# Patient Record
Sex: MALE | Race: WHITE | NOT HISPANIC OR LATINO
[De-identification: names, ages, dates, MRNs, and addresses within clinical notes are randomized per-mention and may not be internally consistent; named-entity substitution may affect disease eponyms.]

---

## 2018-05-13 ENCOUNTER — TRANSCRIPTION ENCOUNTER (OUTPATIENT)
Age: 58
End: 2018-05-13

## 2019-09-08 ENCOUNTER — EMERGENCY (EMERGENCY)
Facility: HOSPITAL | Age: 59
LOS: 0 days | Discharge: HOME | End: 2019-09-08
Attending: EMERGENCY MEDICINE | Admitting: EMERGENCY MEDICINE
Payer: COMMERCIAL

## 2019-09-08 VITALS
HEIGHT: 68 IN | TEMPERATURE: 96 F | SYSTOLIC BLOOD PRESSURE: 150 MMHG | RESPIRATION RATE: 18 BRPM | HEART RATE: 84 BPM | DIASTOLIC BLOOD PRESSURE: 114 MMHG | WEIGHT: 179.9 LBS | OXYGEN SATURATION: 98 %

## 2019-09-08 VITALS
RESPIRATION RATE: 18 BRPM | HEART RATE: 83 BPM | SYSTOLIC BLOOD PRESSURE: 154 MMHG | OXYGEN SATURATION: 97 % | TEMPERATURE: 97 F | DIASTOLIC BLOOD PRESSURE: 93 MMHG

## 2019-09-08 DIAGNOSIS — N39.43 POST-VOID DRIBBLING: ICD-10-CM

## 2019-09-08 LAB
APPEARANCE UR: CLEAR — SIGNIFICANT CHANGE UP
BILIRUB UR-MCNC: NEGATIVE — SIGNIFICANT CHANGE UP
COLOR SPEC: YELLOW — SIGNIFICANT CHANGE UP
DIFF PNL FLD: NEGATIVE — SIGNIFICANT CHANGE UP
GLUCOSE UR QL: NEGATIVE MG/DL — SIGNIFICANT CHANGE UP
KETONES UR-MCNC: NEGATIVE — SIGNIFICANT CHANGE UP
LEUKOCYTE ESTERASE UR-ACNC: NEGATIVE — SIGNIFICANT CHANGE UP
NITRITE UR-MCNC: NEGATIVE — SIGNIFICANT CHANGE UP
PH UR: 8.5 — SIGNIFICANT CHANGE UP (ref 5–8)
PROT UR-MCNC: NEGATIVE MG/DL — SIGNIFICANT CHANGE UP
SP GR SPEC: 1.02 — SIGNIFICANT CHANGE UP (ref 1.01–1.03)
UROBILINOGEN FLD QL: 0.2 MG/DL — SIGNIFICANT CHANGE UP (ref 0.2–0.2)

## 2019-09-08 PROCEDURE — 99283 EMERGENCY DEPT VISIT LOW MDM: CPT

## 2019-09-08 NOTE — ED PROVIDER NOTE - PATIENT PORTAL LINK FT
You can access the FollowMyHealth Patient Portal offered by [facility] by registering at the following website: [email or web address]. By joining [employer]’s FollowMyHealth portal, you will also be able to view your health information using other applications (apps) compatible with our system.

## 2019-09-08 NOTE — ED PROVIDER NOTE - NS ED ROS FT
Review of Systems    Constitutional: (-) fever, (-) chills  Cardiovascular: (-) chest pain, (-) syncope  Respiratory: (-) cough, (-) shortness of breath  Gastrointestinal: (-) pain, (-) nausea, (-) vomiting, (-) diarrhea  : (+) slow urine stream  Musculoskeletal: (-) neck pain, (-) back pain, (-) joint pain  Integumentary: (-) rash, (-) edema  Neurological: (-) headache, (-) altered mental status

## 2019-09-08 NOTE — ED PROVIDER NOTE - OBJECTIVE STATEMENT
57 yo M  c/o slow urine stream x 2 months and has been dribbling x 1 week. He made appt with urology  for 9/20/19. No f/c/n/v/c/d. No hematuria or dysuria. No flank or abdominal pains.

## 2019-09-08 NOTE — ED PROVIDER NOTE - ATTENDING CONTRIBUTION TO CARE
58 year old male, come sin with difficulty urinating for 2 months, worsening progressively, has an appointment with urology next week, comes in with difficulty initiating urination, but patient is full urinating and emptying bladder, patient denies abdominal pain, no cp/sob, no n/v/d, patient offered Don catheter, declined, patient urinated and provided a full urine sample.     CONSTITUTIONAL: Well-developed; well-nourished; in no acute distress. Sitting up and providing appropriate history and physical examination  SKIN: skin exam is warm and dry, no acute rash.  HEAD: Normocephalic; atraumatic.  EYES: PERRL, 3 mm bilateral, no nystagmus, EOM intact; conjunctiva and sclera clear.  ENT: No nasal discharge; airway clear.  NECK: Supple; non tender. + full passive ROM in all directions. No JVD  CARD: S1, S2 normal; no murmurs, gallops, or rubs. Regular rate and rhythm. + Symmetric Strong Pulses  RESP: No wheezes, rales or rhonchi. Good air movement bilaterally  ABD: soft; non-distended; non-tender. No Rebound, No Guarding, No signs of peritonitis, No CVA tenderness. No pulsatile abdominal mass. + Strong and Symmetric Pulses  EXT: Normal ROM. No clubbing, cyanosis or edema. Dp and Pt Pulses intact. Cap refill less than 3 seconds  NEURO: CN 2-12 intact, normal finger to nose, normal romberg, stable gait, no sensory or motor deficits, Alert, oriented, grossly unremarkable. No Focal deficits. GCS 15. NIH 0  PSYCH: Cooperative, appropriate.

## 2019-09-08 NOTE — ED PROVIDER NOTE - PHYSICAL EXAMINATION
Gen: Alert, NAD, well appearing  Head: NC, AT, PERRL, EOMI, normal lids/conjunctiva  Neck: +supple, no tenderness/meningismus,  Pulm: Bilateral BS, normal resp effort, no wheeze/stridor/retractions  CV: RRR, no murmer  Abd: soft, NT/ND. No flank or CVA tenderness  Mskel: no edema/erythema/cyanosis  Skin: no rash, warm/dry  Neuro: AAOx3, no sensory/motor deficits

## 2019-09-08 NOTE — ED ADULT NURSE NOTE - CHPI ED NUR SYMPTOMS POS
difficulty urinating x 1 month, states he is able to empty bladder but with difficulty described as "a slow stream"

## 2019-09-08 NOTE — ED PROVIDER NOTE - CLINICAL SUMMARY MEDICAL DECISION MAKING FREE TEXT BOX
I personally evaluated the patient. I reviewed the Resident’s or Physician Assistant’s note (as assigned above), and agree with the findings and plan except as documented in my note. Patient would like to have follow up with OP urology. I have fully discussed the medical management and delivery of care with the patient. I have discussed any available labs, imaging and treatment options with the patient. Patient confirms understanding and has been given detailed return precautions. Patient instructed to return to the ED should symptoms persist or worsen. Patient has demonstrated capacity and has verbalized understanding. Patient is well appearing upon discharge.

## 2019-09-09 LAB
CULTURE RESULTS: SIGNIFICANT CHANGE UP
SPECIMEN SOURCE: SIGNIFICANT CHANGE UP

## 2019-09-26 ENCOUNTER — APPOINTMENT (OUTPATIENT)
Dept: UROLOGY | Facility: CLINIC | Age: 59
End: 2019-09-26
Payer: COMMERCIAL

## 2019-09-26 VITALS
BODY MASS INDEX: 27.28 KG/M2 | HEART RATE: 99 BPM | WEIGHT: 180 LBS | HEIGHT: 68 IN | SYSTOLIC BLOOD PRESSURE: 147 MMHG | DIASTOLIC BLOOD PRESSURE: 78 MMHG

## 2019-09-26 DIAGNOSIS — Z78.9 OTHER SPECIFIED HEALTH STATUS: ICD-10-CM

## 2019-09-26 DIAGNOSIS — G25.81 RESTLESS LEGS SYNDROME: ICD-10-CM

## 2019-09-26 PROBLEM — Z00.00 ENCOUNTER FOR PREVENTIVE HEALTH EXAMINATION: Status: ACTIVE | Noted: 2019-09-26

## 2019-09-26 PROCEDURE — 99204 OFFICE O/P NEW MOD 45 MIN: CPT

## 2019-09-26 NOTE — LETTER BODY
[Dear  ___] : Dear  [unfilled], [Consult Letter:] : I had the pleasure of evaluating your patient, [unfilled]. [Please see my note below.] : Please see my note below. [Consult Closing:] : Thank you very much for allowing me to participate in the care of this patient.  If you have any questions, please do not hesitate to contact me. [Sincerely,] : Sincerely, [FreeTextEntry2] : Alonso Rosales MD\par [address] [facility] [facility] [address]\[place] [address]\par

## 2019-09-26 NOTE — HISTORY OF PRESENT ILLNESS
[FreeTextEntry1] : Jordan is a 59-year-old male who for most of his life and whatever he does does it to the extreme. He had trouble with urination which he thought was due to his addiction to pain medicine which he had been given for restless leg syndrome. When that was stopped after a 24-hour drug induced coma and he was “off of the medication” he found the stream got a little bit better but within a few months he got back to really bad. In specific with respect to his AUA symptom score\par incomplete emptying – five\par frequency – five\par intermittency – five\par urgency – one\par slow stream – four\par straining – five\par with nocturia times three and a quality-of-life of six this gives him an AUA symptom score of 25/3/6\par \par He has another issue and that he is reportedly diagnosed with low testosterone at this point getting 225 mg a week from Dr. Vaca. At one point he was 550 every two weeks so now he’s getting 450 every two weeks but getting it at a half a dose every week. Recently he was on a cruise and he gave himself an extra shot (if he knows how to inject himself and not sure why he has to go to the doctor for injections anyway, but that is another issue. When he went to the doctor three days later he says they knew he had given himself a dose three days before but they still gave him his next dose. Blood test from that day showed that his “trough” had a free testosterone of 162 where normal is 35 – 155. His total testosterone was a 68 due to a low normal sex hormone binding globulin of 28, his BMP was normal as was his LFTs. His hemoglobin was low at 12.9 while the estradiol was high at 42 and the PSA was .5 which is excellent.\par \par His questions to me are one is the testosterone possibly causing his voiding dysfunction. Two what can we do to help him urinate better.\par  [Currently Experiencing ___] :  [unfilled] [Urinary Urgency] : urinary urgency [Urinary Frequency] : urinary frequency [Nocturia] : nocturia [Straining] : straining [Weak Stream] : weak stream [Intermittency] : intermittency [Erectile Dysfunction] : Erectile Dysfunction [None] : None

## 2019-09-26 NOTE — ASSESSMENT
[FreeTextEntry1] : His physical exam shows a very small prostate. He has a large penis but the tissue feels indurated. He has not had priapism but he will wear a Luevano for two or three hours at a time and that may be causing ischemia to the penis. There are several issues here not the least of which is I think he’s being overdose. As I pointed out to him he got his next dose at the doctor’s office because he “felt low. If his feeling low and decreased erections are secondary to truly low levels of testosterone then his levels on a day when he felt low or not to come back is juiced. By definition therefore we cannot tell his levels through his symptoms. He has options but if he wants to continue in this office we will be doing this with evidence-based medicine. That means he will check his levels this trough and peak and adjust his dose so he goes between low normal at a trough and high normal at a peak but I will not treat him so that he goes above normal even if he feels tired as they could be true true and nonrelated. Number two we will workup his urination to see if there is a physical cause versus functional versus anxiety or stress. Finally with respect to his erections he’s using hundred milligrams of sildenafil but sometimes he can take three in one night. I reviewed with him that again his saying that does better is more perception than reality because numerous studies have shown that once your taking hundred milligrams you pretty much saturating all of your enzyme that you want to get in higher doses neither last longer no work better the just give you more side effects.\par \par The plan therefore is to check his urine to make sure there is no infection, will keep a record of his intake and output so we can see if any of this is subject to behavior modification (he will do this on a day that he is not taking Coke and if he does take Coke in the near future he’ll do one on a day that he does take Coke as cocaine can interfere with voiding) he will also go for a renal bladder ultrasound so we can check to make sure there is no obstructive uropathy. I do want a note from his doctor that his cardiovascular system is acceptable a.k.a. [place] criteria and though he’s a young looking 59 he is 59 and if he wishes to manage the testosterone here what we will do is have him stay off it for a few weeks so that we can get into a bottle level that will be a new start we will then treat him with one bottle every two weeks which is 200 mg which I know is a lot less than he is used to what is probably a safer does. Once I know that he is able to do it himself, he will come in at least once and show me how he is doing it, he will then inject himself and we will get trough and peak before and just after a third dose. He will then adjust his dose and timing to keep his trough as close to low as we can get with his high as close to high as we can get or if he prefers to hover closer to the middle we can adjust the dose based on knowledge.\par

## 2019-10-08 ENCOUNTER — FORM ENCOUNTER (OUTPATIENT)
Age: 59
End: 2019-10-08

## 2019-10-09 ENCOUNTER — OUTPATIENT (OUTPATIENT)
Dept: OUTPATIENT SERVICES | Facility: HOSPITAL | Age: 59
LOS: 1 days | Discharge: HOME | End: 2019-10-09
Payer: COMMERCIAL

## 2019-10-09 DIAGNOSIS — R33.9 RETENTION OF URINE, UNSPECIFIED: ICD-10-CM

## 2019-10-09 DIAGNOSIS — R39.198 OTHER DIFFICULTIES WITH MICTURITION: ICD-10-CM

## 2019-10-09 PROCEDURE — 76770 US EXAM ABDO BACK WALL COMP: CPT | Mod: 26

## 2019-10-30 ENCOUNTER — APPOINTMENT (OUTPATIENT)
Dept: UROLOGY | Facility: CLINIC | Age: 59
End: 2019-10-30
Payer: COMMERCIAL

## 2019-10-30 VITALS
HEART RATE: 100 BPM | WEIGHT: 180 LBS | SYSTOLIC BLOOD PRESSURE: 137 MMHG | DIASTOLIC BLOOD PRESSURE: 91 MMHG | HEIGHT: 68 IN | BODY MASS INDEX: 27.28 KG/M2

## 2019-10-30 DIAGNOSIS — N52.9 MALE ERECTILE DYSFUNCTION, UNSPECIFIED: ICD-10-CM

## 2019-10-30 PROCEDURE — 51798 US URINE CAPACITY MEASURE: CPT

## 2019-10-30 PROCEDURE — 51741 ELECTRO-UROFLOWMETRY FIRST: CPT

## 2019-10-30 PROCEDURE — 99214 OFFICE O/P EST MOD 30 MIN: CPT | Mod: 25

## 2019-10-30 NOTE — LETTER HEADER
[FreeTextEntry3] : Keyla Garza M.D.\par Director of Urology\par [facility]\par [address] [place] [address], [address]\[place] [address]

## 2019-10-30 NOTE — PHYSICAL EXAM
[General Appearance - Well Developed] : well developed [General Appearance - Well Nourished] : well nourished [Normal Appearance] : normal appearance [Well Groomed] : well groomed [General Appearance - In No Acute Distress] : no acute distress [Abdomen Soft] : soft [Abdomen Tenderness] : non-tender [Costovertebral Angle Tenderness] : no ~M costovertebral angle tenderness [Heart Rate And Rhythm] : Heart rate and rhythm were normal [] : no respiratory distress [Respiration, Rhythm And Depth] : normal respiratory rhythm and effort [Exaggerated Use Of Accessory Muscles For Inspiration] : no accessory muscle use [Oriented To Time, Place, And Person] : oriented to person, place, and time [Affect] : the affect was normal [Mood] : the mood was normal [Not Anxious] : not anxious [Normal Station and Gait] : the gait and station were normal for the patient's age

## 2019-10-30 NOTE — ASSESSMENT
[FreeTextEntry1] : Last week on friday the 25th, 22 days after the labs show that he had already bottomed out, he could not wait anymore so he gave himself 250 mg.\par \par Of note is that when he did not take the testosterone for those three weeks and in fact had bottom doubt he did not see any improvement or worsening of his urinary symptoms are as expected and that such related to enlargement of his prostate because of the chronic overdosing once anabolic steroids his voiding dysfunction is not directly related to his testosterone abuse.\par \par The uroflow was staccato he is voiding with Mckenna Luis assistance and affect the knowledge is that he cannot sit or stand and urinate he is essentially pushing out every time he goes.\par \par With respect to his testosterone I will have him go to 200 mg i.e. one mL every two weeks he injected last Friday, he will inject next Friday and two Fridays from now getting trough and peak at that time\par \par With respect to his urination I’m going to start him on Flomax though he may end up needing Proscar as well I like to hold off on that as one it can affect his hormonal you and two would like to stick with monotherapy for now so if there are problems will know which one it is.\par

## 2019-10-30 NOTE — LETTER BODY
[Dear  ___] : Dear  [unfilled], [Courtesy Letter:] : I had the pleasure of seeing your patient, [unfilled], in my office today. [Please see my note below.] : Please see my note below. [Sincerely,] : Sincerely, [FreeTextEntry2] : Alonso Rosales MD\par [address] [facility] [address]\[place] [address]\par

## 2019-11-25 ENCOUNTER — RX RENEWAL (OUTPATIENT)
Age: 59
End: 2019-11-25

## 2019-12-12 ENCOUNTER — APPOINTMENT (OUTPATIENT)
Dept: UROLOGY | Facility: CLINIC | Age: 59
End: 2019-12-12
Payer: COMMERCIAL

## 2019-12-12 DIAGNOSIS — R79.89 OTHER SPECIFIED ABNORMAL FINDINGS OF BLOOD CHEMISTRY: ICD-10-CM

## 2019-12-12 PROCEDURE — 51798 US URINE CAPACITY MEASURE: CPT

## 2019-12-12 PROCEDURE — 99213 OFFICE O/P EST LOW 20 MIN: CPT | Mod: 25

## 2019-12-12 PROCEDURE — 51741 ELECTRO-UROFLOWMETRY FIRST: CPT

## 2019-12-12 NOTE — LETTER BODY
[Dear  ___] : Dear  [unfilled], [Please see my note below.] : Please see my note below. [Courtesy Letter:] : I had the pleasure of seeing your patient, [unfilled], in my office today. [FreeTextEntry2] : Alonso Rosales MD\par [address] [facility]\[place] [address]\par  [Sincerely,] : Sincerely,

## 2019-12-12 NOTE — HISTORY OF PRESENT ILLNESS
[FreeTextEntry1] : Jordan was last seen October 30. I recommended Flomax to get a repeat record of his intake and output in six weeks and consider a repeat flow. We had also decided he had bottomed out his testosterone we were going to restart him on a standard starting dose of tuna milligrams every two weeks get blood on November 22 and 24th he had blood drawn on November 22 and 25th and he is here for review. He did not bring his record as he thought he was doing so great that he didn’t need to, please note he was doing great so after wiki stop the medication symptoms came right back. Theoretically there for his only been on the medication for four weeks but he says he’s now been urinating really well. [Currently Experiencing ___] :  [unfilled] [Urinary Urgency] : urinary urgency [Urinary Frequency] : urinary frequency [Nocturia] : nocturia [Straining] : straining [Weak Stream] : weak stream [Intermittency] : intermittency [Erectile Dysfunction] : Erectile Dysfunction [None] : None

## 2019-12-12 NOTE — LETTER HEADER
[FreeTextEntry3] : Keyla Garza M.D.\par Director of Urology\par [facility]\par [address] [place] [address], [address]\[place] [address]

## 2019-12-12 NOTE — PHYSICAL EXAM
[General Appearance - Well Nourished] : well nourished [General Appearance - Well Developed] : well developed [Normal Appearance] : normal appearance [Well Groomed] : well groomed [General Appearance - In No Acute Distress] : no acute distress [Respiration, Rhythm And Depth] : normal respiratory rhythm and effort [] : no respiratory distress [Exaggerated Use Of Accessory Muscles For Inspiration] : no accessory muscle use [Affect] : the affect was normal [Oriented To Time, Place, And Person] : oriented to person, place, and time [Not Anxious] : not anxious [Mood] : the mood was normal [Normal Station and Gait] : the gait and station were normal for the patient's age

## 2019-12-12 NOTE — ASSESSMENT
[FreeTextEntry1] : Physical exam shows no signs of fluid retention\par \par With respect to the testosterone his testosterone levels on the peak are pretty good but the estradiol is too high. I’m also not happy with an AST of 90. That was low normal at 16 and 14 respectively on August 30 before we started him on the medication so this is a rather rapid rise and adverse fashion to the testosterone. The total levels are upper normal but not anything out of the ordinary but the estradiol at 59 is also of concern.\par \par I’m going to drop his dose to 150 mg,  drop his frequency to every 10 days, get the blood drawn on days 30 and 32. He has not injected since the 22nd so he will start injecting again tomorrow get the blood drawn on January 2 and January 4 and see me on January 10 hopefully by then we will have the bloods be able to decide on his dosing. If he continues to have elevated liver enzymes despite dropping the dose will have to work this out between his primary care doctor myself.\par

## 2020-01-16 ENCOUNTER — APPOINTMENT (OUTPATIENT)
Dept: UROLOGY | Facility: CLINIC | Age: 60
End: 2020-01-16
Payer: COMMERCIAL

## 2020-01-16 VITALS
SYSTOLIC BLOOD PRESSURE: 144 MMHG | BODY MASS INDEX: 27.28 KG/M2 | WEIGHT: 180 LBS | HEART RATE: 77 BPM | HEIGHT: 68 IN | DIASTOLIC BLOOD PRESSURE: 90 MMHG

## 2020-01-16 PROCEDURE — 99213 OFFICE O/P EST LOW 20 MIN: CPT

## 2020-01-16 NOTE — PHYSICAL EXAM
[General Appearance - Well Developed] : well developed [General Appearance - Well Nourished] : well nourished [Normal Appearance] : normal appearance [General Appearance - In No Acute Distress] : no acute distress [Well Groomed] : well groomed [Abdomen Soft] : soft [Abdomen Tenderness] : non-tender [Costovertebral Angle Tenderness] : no ~M costovertebral angle tenderness [Heart Rate And Rhythm] : Heart rate and rhythm were normal [] : no respiratory distress [Edema] : no peripheral edema [Exaggerated Use Of Accessory Muscles For Inspiration] : no accessory muscle use [Respiration, Rhythm And Depth] : normal respiratory rhythm and effort [Auscultation Breath Sounds / Voice Sounds] : lungs were clear to auscultation bilaterally [Oriented To Time, Place, And Person] : oriented to person, place, and time [Mood] : the mood was normal [Affect] : the affect was normal [Normal Station and Gait] : the gait and station were normal for the patient's age [Not Anxious] : not anxious

## 2020-01-16 NOTE — ASSESSMENT
[FreeTextEntry1] : His physical exam shows no signs of fluid retention. The problem I have here is he did his own protocol taking 200 mg every 10 days the trough is too low the peak is too high so the question is do we just drop the dose and keep to the same frequency which I don’t think would work as he’s a ready dropping to too low even on 200 mg and if he only takes every 10 days. Alternatively we can have him inject more often than the question is do we need three quarters of a cc or do we drop it to a half a cc.\par \par He gets very cranky when he gets too low and if he gets a little bit too high for a month or two I think a live with it so we will keep him at hundred and 50 mg holding on to the rest of the bottle if he can get it out I will get it out for him the next time he comes in every seven days will get blood drawn just before and just after the third dose see me in a month.\par

## 2020-01-16 NOTE — LETTER HEADER
[FreeTextEntry3] : Keyla Garza M.D.\par Director of Urology\par [facility]\par [address] [place] [address], [address]\[place] [address]

## 2020-01-16 NOTE — HISTORY OF PRESENT ILLNESS
[FreeTextEntry1] : Jordan is being followed with low testosterone he had been juice before were trying to get into a level that keeps them happy but does it make him sick. Supposed to be taking three quarters of an mL every 10 days he had trough and peak levels drawn on January 2 January 4 he was supposed to come in the end of the week and comes in almost 2 weeks later because he didn’t know that the blood was available please note he’s been asked in the future call us to make his own decisions.

## 2020-01-16 NOTE — LETTER BODY
[Dear  ___] : Dear  [unfilled], [Courtesy Letter:] : I had the pleasure of seeing your patient, [unfilled], in my office today. [Please see my note below.] : Please see my note below. [Sincerely,] : Sincerely, [FreeTextEntry2] : Alonso Rosales MD\par [address] [facility] [address]\[place] [address]\par

## 2020-01-23 ENCOUNTER — RX RENEWAL (OUTPATIENT)
Age: 60
End: 2020-01-23

## 2020-02-27 ENCOUNTER — TRANSCRIPTION ENCOUNTER (OUTPATIENT)
Age: 60
End: 2020-02-27

## 2020-03-09 ENCOUNTER — APPOINTMENT (OUTPATIENT)
Dept: UROLOGY | Facility: CLINIC | Age: 60
End: 2020-03-09
Payer: COMMERCIAL

## 2020-03-09 VITALS
DIASTOLIC BLOOD PRESSURE: 84 MMHG | SYSTOLIC BLOOD PRESSURE: 138 MMHG | WEIGHT: 180 LBS | HEIGHT: 62 IN | BODY MASS INDEX: 33.13 KG/M2 | HEART RATE: 100 BPM

## 2020-03-09 PROCEDURE — 99213 OFFICE O/P EST LOW 20 MIN: CPT

## 2020-03-09 NOTE — HISTORY OF PRESENT ILLNESS
[Erectile Dysfunction] : Erectile Dysfunction [FreeTextEntry1] : Jordan is a 59-year-old male who is being treated for low testosterone. He was supposed to be injecting 3/4 mL every 7 days however, he has been injecting 1.5 mL every 7 days (He tells me he was getting 1.5 mL from each one a bottle which is not possible). He presents today to review his blood work.\par \par He states he initially feels well after injection however, he has been feeling significant decrease in libido energy towards the end of the cycle.

## 2020-03-09 NOTE — LETTER BODY
[Dear  ___] : Dear  [unfilled], [Courtesy Letter:] : I had the pleasure of seeing your patient, [unfilled], in my office today. [Please see my note below.] : Please see my note below. [Sincerely,] : Sincerely, [FreeTextEntry2] : Alonso Rosales MD\par [address] [facility] [address]\[place] [address]\par

## 2020-03-09 NOTE — ASSESSMENT
[FreeTextEntry1] : He is been injecting the wrong amount. He is injecting too much and bottomed out.\par  He needs to inject 3/4 mL and we will change to every 5 days. Reviewed with him on a syringe what that dose would be. He understands. A prescription was sent to the pharmacy with followup in one month with trough/peak values

## 2020-03-09 NOTE — LETTER HEADER
[FreeTextEntry3] : Keyla Garza M.D.\par Director of Urology\par [facility]\par [address] [place] [address], [address]\[place] [address]

## 2020-04-06 ENCOUNTER — RX RENEWAL (OUTPATIENT)
Age: 60
End: 2020-04-06

## 2020-04-27 ENCOUNTER — APPOINTMENT (OUTPATIENT)
Dept: UROLOGY | Facility: CLINIC | Age: 60
End: 2020-04-27
Payer: COMMERCIAL

## 2020-04-27 PROCEDURE — 99215 OFFICE O/P EST HI 40 MIN: CPT | Mod: 95

## 2020-04-27 NOTE — ASSESSMENT
[FreeTextEntry1] : If he has been accurate with the dosing and getting these numbers with the dose I had wanted him on -three quarters of a cc every five days -at the very least we need to prolong the interval as the peak is too high while his troughs are upper range rather than low normal. I am suggesting that he go back to every seven days and get repeat bloods in a month. If his estradiol stays high we may have to add in Arimidex. If it is borderline high we may leave him alone.\par As he is still feeling fatigued by definition his fatigue is not completely related to hypogonadism and despite having an excellent testosterone level he is still fatigue so it may be multi-factorial. He will have to contact his primary care doctor and look into it from another viewpoint. I wonder if it is related to his anemia\par \par With respect to his erections by day five they are not as good as he’d like and as we can tell from his trough numbers it is definitely not totally the testosterone. If he’s willing to consider alternatives such as self injection I would do a Doppler study to see to what extent his dysfunction is vascular. If there is indeed a vascular cause and he responds well to injections I can teach him to self inject. He is aware that for me to start treating him directly such as by giving him a prescription for sildenafil which we could help him get less expensively, or to teach him to self inject, I would need [place] criteria classification from his doctor\par At this point I’m recommending that he continue with three quarters of an mL but change the interval to every seven days. He will inject today, get blood on May 18th and 20th and see me the week after. He needs to speak to his primary care doctor / cardiologist re [place] criteria, about his anemia, the fact that he is still feeling fatigued despite being on the border of juiced and we know that whatever the reason for his poor erection is, currently it is not low testosterone as even on his trough day he was near upper normal. Once the pandemic wanes we will do a Doppler study and see if he is a good candidate for self injection. With respect to his urination he will stay with Flomax though once the pandemic is over he may consider minimally invasive treatments so we can get off the Flomax\par

## 2020-04-27 NOTE — HISTORY OF PRESENT ILLNESS
[Other Location: e.g. School (Enter Location, City,State)___] : at [unfilled], at the time of the visit. [Self] : self [Medical Office: ([facility])___] : at the medical office located in  [Urinary Urgency] : urinary urgency [Currently Experiencing ___] :  [unfilled] [Nocturia] : nocturia [Straining] : straining [Urinary Frequency] : urinary frequency [Intermittency] : intermittency [Weak Stream] : weak stream [Erectile Dysfunction] : Erectile Dysfunction [None] : None [FreeTextEntry1] : Jordan is a 59-year-old male last seen on March 9, 2020 for low testosterone. He had been requested to inject three quarters of an mL every seven days however he had been injecting 1.5 mL every seven days which I didn’t understand as he told me he was getting 1.5 mL from a one mL bottle\par laboratory values with him on One and a half mL every seven days showed a testosterone up to 1232 and an estradiol up to 51. His free and bioavailable were high normal however even with that his trough levels were bottoming out. We decided to go to three quarters of an mL every five days get blood in follow-up.\par \par He tell me that he feels tired and sex life is still poor as penis doesn’t work well even with Viagra at the 100 mg dose especially by day four or five fate taking the testosterone\par \par With respect to his voiding he tells me with Flomax he does well enough, for now he would hold on minimally invasive procedures. However if he forgets to take the medication in the next morning he is already having trouble.\par  [FreeTextEntry2] : pt told me he got the telemed consent reviewed it and had no questions

## 2020-04-27 NOTE — PHYSICAL EXAM
[General Appearance - Well Nourished] : well nourished [Normal Appearance] : normal appearance [General Appearance - Well Developed] : well developed [General Appearance - In No Acute Distress] : no acute distress [Well Groomed] : well groomed [Oriented To Time, Place, And Person] : oriented to person, place, and time [Affect] : the affect was normal [Mood] : the mood was normal [Not Anxious] : not anxious

## 2020-04-27 NOTE — LETTER BODY
[Courtesy Letter:] : I had the pleasure of seeing your patient, [unfilled], in my office today. [Please see my note below.] : Please see my note below. [Dear  ___] : Dear  [unfilled], [DrTavia  ___] : Dr. SALES [FreeTextEntry2] : Alonso Rosales MD\par [address] [facility] [address]\[place] [address]\par  [Sincerely,] : Sincerely,

## 2020-04-27 NOTE — LETTER HEADER
[FreeTextEntry3] : Keyla Garza M.D.\par Director of Urology\par [facility]\par [address] [place] [address], [address]\[place] [address]

## 2020-04-27 NOTE — REVIEW OF SYSTEMS
[Feeling Tired] : not feeling tired [Erectile Dysfunction] : erectile dysfunction [see HPI] : see HPI [Negative] : Neurological

## 2020-06-25 ENCOUNTER — APPOINTMENT (OUTPATIENT)
Dept: UROLOGY | Facility: CLINIC | Age: 60
End: 2020-06-25
Payer: COMMERCIAL

## 2020-06-25 PROCEDURE — 99215 OFFICE O/P EST HI 40 MIN: CPT | Mod: 95

## 2020-06-25 NOTE — PHYSICAL EXAM
[General Appearance - Well Developed] : well developed [General Appearance - Well Nourished] : well nourished [Normal Appearance] : normal appearance [Well Groomed] : well groomed [General Appearance - In No Acute Distress] : no acute distress [FreeTextEntry1] : He says no edema [] : no respiratory distress [Respiration, Rhythm And Depth] : normal respiratory rhythm and effort [Exaggerated Use Of Accessory Muscles For Inspiration] : no accessory muscle use [Affect] : the affect was normal [Oriented To Time, Place, And Person] : oriented to person, place, and time [Not Anxious] : not anxious [Mood] : the mood was normal [Normal Station and Gait] : the gait and station were normal for the patient's age

## 2020-06-25 NOTE — HISTORY OF PRESENT ILLNESS
[Home] : at home, [unfilled] , at the time of the visit. [Medical Office: ([facility])___] : at the medical office located in  [FreeTextEntry3] : he has received, reviewed and agreed to the telemedicine consent  [FreeTextEntry1] : I communicated with him by his cell phone at [phone] and any email communications will be sent to ---- [email or web address]  \par \par Jordan is a 59-year-old male who we have been following since September 2019 for low testosterone. He had been requested to inject three quarters of an amount every seven days that he had been ejecting double that dose which didn’t make sense as he can get 1.5 ML from a one mL bottle. Regardless we arrange for him to drop down to three quarters of an amount every seven days with blood on May 18 and 20th and see me the week after. He still needed [facility] consultation and he needs to see his PCP Minal[facility]s he is still feeling fatigued despite him close to juice. As far as his erections we were going to consider a Doppler study and he tells me when the testosterone is at the appropriate levels and using sildenafil at the hundred milligrams dose he is working fine does not feel he needs injections. With respect to his voiding it is not perfect but it is good enough and for now he was going to stay with the [facility] for his voiding issues.\par \par He tells me that he ran out of drug 3weeks ago and he has been using 0.75 ml q 7 days and until he ran out he was good including Viagra at100 mg was good. He had trough levels drawn on May 18 and peak on May 20 while he was still on the medication.\par

## 2020-06-25 NOTE — LETTER HEADER
[FreeTextEntry3] : Keyla Garza M.D.\par Director of Urology\par [facility]\par [address] [place] [address], [address]\[place] [address]

## 2020-06-25 NOTE — LETTER BODY
[Courtesy Letter:] : I had the pleasure of seeing your patient, [unfilled], in my office today. [Dear  ___] : Dear  [unfilled], [Sincerely,] : Sincerely, [Please see my note below.] : Please see my note below. [FreeTextEntry2] : Alonso Rosales MD\par [address] [facility] [address]\[place] [address]\par  [DrTavia  ___] : Dr. SALES

## 2020-06-25 NOTE — ASSESSMENT
[FreeTextEntry1] : His trough is okay but his peak is too high so we will drop 0.6 ML, 120 mg every seven weeks. He will get blood in six weeks for trough on August 8 and peak on August 10 following up on August 19. Again if he has problems and can’t get through the staff I gave her my email and he will contact me directly.

## 2020-08-21 ENCOUNTER — RX RENEWAL (OUTPATIENT)
Age: 60
End: 2020-08-21

## 2020-09-03 ENCOUNTER — APPOINTMENT (OUTPATIENT)
Dept: UROLOGY | Facility: CLINIC | Age: 60
End: 2020-09-03
Payer: COMMERCIAL

## 2020-09-03 PROCEDURE — 99214 OFFICE O/P EST MOD 30 MIN: CPT | Mod: 95

## 2020-09-03 NOTE — HISTORY OF PRESENT ILLNESS
[Home] : at home, [unfilled] , at the time of the visit. [Medical Office: ([facility])___] : at the medical office located in  [Spouse] : spouse [Verbal consent obtained from patient] : the patient, [unfilled] [FreeTextEntry3] : he has received, reviewed and agreed to the telemedicine consent  [FreeTextEntry1] : I communicated with him by his cell phone at [phone] and any email communications will be sent to [email or web address]\par \par I is a 59-year-old male we had last seen using telemedicine on June 25, 2020. We had dropped his dose to 0.6 ML, 120 mg every seven days he was supposed to get blood in six weeks for trough on August 8 and on August 10 the peak and follow-up in August 19. He had some trouble getting the testosterone is the pharmacy didn’t have enough to fill the full supply and once he took part he could not get more so was renewed on August 3. Last visit we also renew the tamsulosin and sildenafil. He is here today to see how he is doing it to check his blood results. We once again our meeting by telemedicine.\par \par He tells me that on the current dose he feels good and with the sildenafil at the  100 milligrams dose he is doing  fine but is best on days 3 and 4. He had bloods done on August 21 and August 19, 2020.\par \par With respect to the urination he is on the Flomax and he says he continues to help but not as well as it did before. He is experiencing increasing symptoms over time but he is not yet to the point that he is willing to consider more invasive intervention. There are no other pills that we think would help and he doesn't want to procedure yet.\par  [Currently Experiencing ___] :  [unfilled] [Urinary Urgency] : urinary urgency [Urinary Frequency] : urinary frequency [Nocturia] : nocturia [Straining] : straining [Weak Stream] : weak stream [Intermittency] : intermittency [Erectile Dysfunction] : Erectile Dysfunction [None] : None

## 2020-09-03 NOTE — REVIEW OF SYSTEMS
[Feeling Tired] : not feeling tired [see HPI] : see HPI [Erectile Dysfunction] : erectile dysfunction [Negative] : Heme/Lymph

## 2020-09-03 NOTE — LETTER BODY
[Dear  ___] : Dear  [unfilled], [Courtesy Letter:] : I had the pleasure of seeing your patient, [unfilled], in my office today. [Please see my note below.] : Please see my note below. [Sincerely,] : Sincerely, [FreeTextEntry2] : Alonso Rosales MD\par [address] [facility] [address]\[place] [address]\par  [DrTavia  ___] : Dr. SALES

## 2020-09-03 NOTE — ASSESSMENT
[FreeTextEntry1] : The estradiol is too high. His testosterone was quite good but I don’t like an estradiol that is 50% above normal.\par \par We can either drop the testosterone dose something he does not want to do or he add in  Arimidex in an off label use. He has decided to go with the Arimidex. He understands that initially it may drop the estradiol too much or not enough we will get blood in three and see him in four weeks. Once we have him on a dose with respect to the testosterone which may have to be lowered, and Arimidex will go back to every three months visits.\par \par Please note he is having a hard time getting the medication from the pharmacy the reader sure he came in eating of the script always running out he will get them at 10 ML bottle by writing for .7 ML once per week he knows to stay at .6 ML.\par \par As above with respect to his urination will stay with the tamsulosin for now though something new comes out he be interested as long as it is not surgical\par Erick white dad may have used the lab the note tonight is [place] birthday he that celebrated I do 21+ years ago your mind meet [place] birthday fell out on the same night after night and your mommy was born it is a the 15th day of the [place] month of aloe two weeks and one day before the [facility] new year did using llama led [facility] the money you Erick white that may have Erick white that as leonardo white dad may have and we laugh out loud to right spirit yet a good day in school you look very tired you tired I think Olu's tired of always diet that I can go to sleep yet I spoke I worked in the right talk you tomorrow morning value tonight is Beatrice Hebrew birthday tonight is night tonight is Beatrice [place] birthday Beatrice not mine my daughter

## 2020-09-03 NOTE — LETTER HEADER
[FreeTextEntry3] : Keyla Garza M.D.\par Director of Urology\par [facility]\par [address] [place] [address], [address]\[place] [address]

## 2020-09-23 ENCOUNTER — RX RENEWAL (OUTPATIENT)
Age: 60
End: 2020-09-23

## 2020-09-24 ENCOUNTER — RX RENEWAL (OUTPATIENT)
Age: 60
End: 2020-09-24

## 2020-10-19 ENCOUNTER — APPOINTMENT (OUTPATIENT)
Dept: UROLOGY | Facility: CLINIC | Age: 60
End: 2020-10-19

## 2020-10-27 ENCOUNTER — RX RENEWAL (OUTPATIENT)
Age: 60
End: 2020-10-27

## 2020-10-30 ENCOUNTER — APPOINTMENT (OUTPATIENT)
Dept: UROLOGY | Facility: CLINIC | Age: 60
End: 2020-10-30
Payer: COMMERCIAL

## 2020-10-30 VITALS
TEMPERATURE: 98.2 F | BODY MASS INDEX: 35.33 KG/M2 | HEIGHT: 62 IN | WEIGHT: 192 LBS | SYSTOLIC BLOOD PRESSURE: 132 MMHG | DIASTOLIC BLOOD PRESSURE: 70 MMHG | HEART RATE: 85 BPM

## 2020-10-30 PROCEDURE — 99072 ADDL SUPL MATRL&STAF TM PHE: CPT

## 2020-10-30 PROCEDURE — 99214 OFFICE O/P EST MOD 30 MIN: CPT

## 2020-10-30 NOTE — HISTORY OF PRESENT ILLNESS
[Currently Experiencing ___] :  [unfilled] [Urinary Urgency] : urinary urgency [Urinary Frequency] : urinary frequency [Nocturia] : nocturia [Straining] : straining [Weak Stream] : weak stream [Intermittency] : intermittency [Erectile Dysfunction] : Erectile Dysfunction [None] : None [FreeTextEntry1] : Jordan is a 59-year-old male who we have been following for erectile dysfunction and low testosterone. Currently he is managed on 0.6 ML of IM testosterone q.7 days. He is doing well at this dose. At his last visit we added Arimidex 1 mg every other day because his estradiol was going too high. Reports good efficacy with his medication denies adverse events.\par \par Additionally he is managed on sildenafil, he tells me he takes anywhere from 1 to 3 tablets each of the 100 mg dose. We discussed that there is no statistical benefit to taking more than 100 mg of sildenafil. What you had 100 mg all of your enzymatic sites of which blockage helps erections have been locked. If you take more arrearages going to have greater side effects.\par \par He reports that his erections aren't as rigid as they used to be and he is looking for alternatives therapies  Please note in the past he had tried other PDE 5 inhibitors with less success that he is getting with Viagra. \par Concerning his urination, tamsulosin has improved his urination drastically however, he is having some breakthrough symptoms mostly one episode nocturia with some decreased force of stream. He is electing for observation only at this point in time and though it’s not good it is good enough.\par

## 2020-10-30 NOTE — LETTER BODY
[Dear  ___] : Dear  [unfilled], [Courtesy Letter:] : I had the pleasure of seeing your patient, [unfilled], in my office today. [Please see my note below.] : Please see my note below. [Sincerely,] : Sincerely, [DrTavia  ___] : Dr. SALES [FreeTextEntry2] : Alonso Rosales MD\par [address] [facility] [address]\[place] [address]\par

## 2020-10-30 NOTE — REVIEW OF SYSTEMS
[see HPI] : see HPI [Erectile Dysfunction] : erectile dysfunction [Negative] : Heme/Lymph [Feeling Tired] : not feeling tired

## 2020-10-30 NOTE — PHYSICAL EXAM
[General Appearance - Well Developed] : well developed [General Appearance - Well Nourished] : well nourished [Normal Appearance] : normal appearance [Well Groomed] : well groomed [General Appearance - In No Acute Distress] : no acute distress [Abdomen Soft] : soft [Abdomen Tenderness] : non-tender [Costovertebral Angle Tenderness] : no ~M costovertebral angle tenderness [Urethral Meatus] : meatus normal [Penis Abnormality] : normal circumcised penis [Urinary Bladder Findings] : the bladder was normal on palpation [Scrotum] : the scrotum was normal [Testes Tenderness] : no tenderness of the testes [Testes Mass (___cm)] : there were no testicular masses [Edema] : no peripheral edema [] : no respiratory distress [Respiration, Rhythm And Depth] : normal respiratory rhythm and effort [Exaggerated Use Of Accessory Muscles For Inspiration] : no accessory muscle use [Oriented To Time, Place, And Person] : oriented to person, place, and time [Affect] : the affect was normal [Mood] : the mood was normal [Not Anxious] : not anxious [Normal Station and Gait] : the gait and station were normal for the patient's age [No Focal Deficits] : no focal deficits [Femoral Lymph Nodes Enlarged Bilaterally] : femoral [Inguinal Lymph Nodes Enlarged Bilaterally] : inguinal

## 2020-10-30 NOTE — LETTER HEADER
[FreeTextEntry3] : Keyla Garza M.D.\par Director of Urology\par [facility]\par [address] [place] [address], [address]\[place] [address]

## 2020-10-30 NOTE — ASSESSMENT
[FreeTextEntry1] : He'll continue testosterone and 0.6 mg every seven days with Arimidex at 1 mg every other day and he feels good his numbers looked good. His physical examination is within normal limits\par However, concerning his erectile dysfunction, he  is looking for alternatives. We discussed  tri-mix injections and he is interested. Therefore we will schedule him for a penile Doppler and with that shows.\par

## 2020-11-06 ENCOUNTER — RX RENEWAL (OUTPATIENT)
Age: 60
End: 2020-11-06

## 2020-11-09 ENCOUNTER — RX RENEWAL (OUTPATIENT)
Age: 60
End: 2020-11-09

## 2020-11-24 ENCOUNTER — APPOINTMENT (OUTPATIENT)
Dept: UROLOGY | Facility: CLINIC | Age: 60
End: 2020-11-24
Payer: COMMERCIAL

## 2020-11-24 VITALS
DIASTOLIC BLOOD PRESSURE: 88 MMHG | BODY MASS INDEX: 28.04 KG/M2 | HEART RATE: 82 BPM | TEMPERATURE: 97.9 F | HEIGHT: 68 IN | SYSTOLIC BLOOD PRESSURE: 142 MMHG | WEIGHT: 185 LBS

## 2020-11-24 PROCEDURE — 93975 VASCULAR STUDY: CPT

## 2020-11-24 PROCEDURE — 54235 NJX CORPORA CAVERNOSA RX AGT: CPT

## 2020-12-21 ENCOUNTER — APPOINTMENT (OUTPATIENT)
Dept: UROLOGY | Facility: CLINIC | Age: 60
End: 2020-12-21
Payer: COMMERCIAL

## 2020-12-21 VITALS
BODY MASS INDEX: 28.49 KG/M2 | TEMPERATURE: 98 F | HEIGHT: 68 IN | WEIGHT: 188 LBS | DIASTOLIC BLOOD PRESSURE: 90 MMHG | HEART RATE: 94 BPM | SYSTOLIC BLOOD PRESSURE: 140 MMHG

## 2020-12-21 PROCEDURE — 99072 ADDL SUPL MATRL&STAF TM PHE: CPT

## 2020-12-21 PROCEDURE — 99214 OFFICE O/P EST MOD 30 MIN: CPT

## 2020-12-22 NOTE — ASSESSMENT
[FreeTextEntry1] : He is doing well on Viagra as long as he wears a penile constrictive device. We discussed the options and he has elected to continue. \par \par Concerning his voiding symptoms, he will keep a voiding diary and follow for review and a possible uroflow study at his next visit at the end of January when we meet to review his trough/peak values.

## 2020-12-22 NOTE — HISTORY OF PRESENT ILLNESS
[Currently Experiencing ___] :  [unfilled] [Urinary Urgency] : urinary urgency [Urinary Frequency] : urinary frequency [Nocturia] : nocturia [Straining] : straining [Weak Stream] : weak stream [Intermittency] : intermittency [Erectile Dysfunction] : Erectile Dysfunction [None] : None [FreeTextEntry1] : Jordan is a 59-year-old male who we have been following for erectile dysfunction and low testosterone. Currently he is managed on 0.6 ML of IM testosterone q.7 days with  Arimidex 1 mg every other day secondary to elevated estradiol. 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 He has since obtained a penile constrictive device has been using it with Viagra. He reports significant improvement. However, he is complaining of a thrombosed penile vein.\par \par Additionally he is complaining of worsening urinary frequency/urgency and nocturia despite tamsulosin

## 2020-12-22 NOTE — LETTER BODY
[Dear  ___] : Dear  [unfilled], [Courtesy Letter:] : I had the pleasure of seeing your patient, [unfilled], in my office today. [Please see my note below.] : Please see my note below. [Sincerely,] : Sincerely, [DrTavia  ___] : Dr. SALES [FreeTextEntry2] : Alonso Rosales MD\par [address] [facility] [address]\[place] [address]\par

## 2020-12-22 NOTE — LETTER HEADER
[FreeTextEntry3] : Keyla Garza M.D.\par Director of Urology\par [facility]\par [address] [place] [address], [address]\[place] [address]

## 2020-12-22 NOTE — PHYSICAL EXAM
[General Appearance - Well Developed] : well developed [General Appearance - Well Nourished] : well nourished [Normal Appearance] : normal appearance [Well Groomed] : well groomed [General Appearance - In No Acute Distress] : no acute distress [Urethral Meatus] : meatus normal [Penis Abnormality] : normal circumcised penis [Urinary Bladder Findings] : the bladder was normal on palpation [Scrotum] : the scrotum was normal [Testes Tenderness] : no tenderness of the testes [Testes Mass (___cm)] : there were no testicular masses [Edema] : no peripheral edema [] : no respiratory distress [Respiration, Rhythm And Depth] : normal respiratory rhythm and effort [Exaggerated Use Of Accessory Muscles For Inspiration] : no accessory muscle use [Oriented To Time, Place, And Person] : oriented to person, place, and time [Affect] : the affect was normal [Mood] : the mood was normal [Not Anxious] : not anxious [Normal Station and Gait] : the gait and station were normal for the patient's age [No Focal Deficits] : no focal deficits [Femoral Lymph Nodes Enlarged Bilaterally] : femoral [Inguinal Lymph Nodes Enlarged Bilaterally] : inguinal [FreeTextEntry1] : 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

## 2021-01-04 ENCOUNTER — RX RENEWAL (OUTPATIENT)
Age: 61
End: 2021-01-04

## 2021-02-01 ENCOUNTER — APPOINTMENT (OUTPATIENT)
Dept: UROLOGY | Facility: CLINIC | Age: 61
End: 2021-02-01
Payer: COMMERCIAL

## 2021-02-01 PROCEDURE — 99215 OFFICE O/P EST HI 40 MIN: CPT | Mod: 95

## 2021-02-02 NOTE — LETTER HEADER
[FreeTextEntry3] : Keyla Garza M.D.\par Director of Urology\par [facility]\par [address] [place] [address], [address]\[place] [address]

## 2021-02-02 NOTE — LETTER BODY
[Dear  ___] : Dear  [unfilled], [Courtesy Letter:] : I had the pleasure of seeing your patient, [unfilled], in my office today. [Please see my note below.] : Please see my note below. [Sincerely,] : Sincerely, [FreeTextEntry2] : Alonso Rosales MD\par [address] [facility] [address]\[place] [address]\par  [DrTavia  ___] : Dr. SALES

## 2021-02-02 NOTE — HISTORY OF PRESENT ILLNESS
[Home] : at home, [unfilled] , at the time of the visit. [Verbal consent obtained from patient] : the patient, [unfilled] [Other Location: e.g. Home (Enter Location, City,State)___] : at [unfilled] [FreeTextEntry3] : he has received, reviewed and agreed to the telemedicine consent  [FreeTextEntry1] : I communicated with him by his cell phone at [phone] and any email communications will be sent to [email or web address]\par \par Jordan is a 60-year-old male who was  Last seen 12/21/2020. At that time we have him on testosterone and 0.6 ML, 120 mg every seven days and he was going to get trough and peak on January 20 and 22nd.\par \par He also complains of erectile dysfunction, a Doppler study was done which showed been no close a dysfunction he was going to consider self injection with the ring PDE five inhibitors with a ring or combination of all three. He tells me it just not working and he does not want to try all three. If every time he wants sex he needs to use a shot of pillar ring is going to take all the pleasure out of it. As far as the thrombosed vein that he developed 21st started using the ring that went away but again multimodal therapy is not to his liking.\par Finally he has his voiding dysfunction and that may be why he’s always TARDIS is waking up at night to avoid several times and even when his testosterone was above normal he was still tired so that is unlikely to be hypogonadism. With respect to the relatively newly developed LUTS he was asked to keep I/O, Since last seen he says his LUTS are bothersome. He email me the record of his intake and output will go over there today. \par \par \par [phone]\par [phone]- CELL\par  EMAIL: [email or web address]

## 2021-02-02 NOTE — ASSESSMENT
[FreeTextEntry1] : With respect to his urinary tract symptoms this may be frequency secondary to over hydration he's going to keep a repeat record and will consider a flow study the second week in February. \par \par With respect to his testosterone he stable enough. The estradiol was a little bit high on the peak and the trough is a little bit low But they are not that far off and it was difficult enough to get into this point so we will just stay like this.\par \par With respect to his erectile dysfunction we will have him see Dr. Wakefield in consideration for an implant

## 2021-02-24 ENCOUNTER — APPOINTMENT (OUTPATIENT)
Dept: UROLOGY | Facility: CLINIC | Age: 61
End: 2021-02-24
Payer: COMMERCIAL

## 2021-02-24 VITALS
SYSTOLIC BLOOD PRESSURE: 127 MMHG | BODY MASS INDEX: 28.49 KG/M2 | HEART RATE: 117 BPM | TEMPERATURE: 98.9 F | WEIGHT: 188 LBS | HEIGHT: 68 IN | DIASTOLIC BLOOD PRESSURE: 77 MMHG

## 2021-02-24 PROCEDURE — 99215 OFFICE O/P EST HI 40 MIN: CPT

## 2021-02-24 PROCEDURE — 99072 ADDL SUPL MATRL&STAF TM PHE: CPT

## 2021-02-24 NOTE — LETTER BODY
[Dear  ___] : Dear  [unfilled], [Consult Letter:] : I had the pleasure of evaluating your patient, [unfilled]. [Please see my note below.] : Please see my note below. [Consult Closing:] : Thank you very much for allowing me to participate in the care of this patient.  If you have any questions, please do not hesitate to contact me. [Sincerely,] : Sincerely, [FreeTextEntry1] : Patient would like to proceed with implantation of inflatable penile prosthesis.  Could you kindly provide risk stratification? [FreeTextEntry3] : Duy Wakefield MD\par Director of Male Sexual Dysfunction and Urologic Prosthetics  [DrTavia  ___] : Dr. SALES

## 2021-02-24 NOTE — HISTORY OF PRESENT ILLNESS
[FreeTextEntry1] : Jordan is a 60-year-old male who was refferred by Dr Malik for penile implant \par \par He also complains of erectile dysfunction, a Doppler study by Dr Malik was done which showed been no close a dysfunction he was going to consider self injection with the ring PDE five inhibitors with a ring or combination of all three. He tells me it just not working and he does not want to try all three. If every time he wants sex he needs to use a shot of pillar ring is going to take all the pleasure out of it. \par \par Finally he has his voiding dysfunction takes flomax -- interested in urolift -- still do to flow study with dr Malik

## 2021-03-04 ENCOUNTER — APPOINTMENT (OUTPATIENT)
Dept: UROLOGY | Facility: CLINIC | Age: 61
End: 2021-03-04
Payer: COMMERCIAL

## 2021-03-04 VITALS
SYSTOLIC BLOOD PRESSURE: 153 MMHG | HEIGHT: 68 IN | BODY MASS INDEX: 28.04 KG/M2 | WEIGHT: 185 LBS | TEMPERATURE: 98 F | HEART RATE: 67 BPM | DIASTOLIC BLOOD PRESSURE: 86 MMHG

## 2021-03-04 PROCEDURE — 99213 OFFICE O/P EST LOW 20 MIN: CPT | Mod: 25

## 2021-03-04 PROCEDURE — 51798 US URINE CAPACITY MEASURE: CPT

## 2021-03-04 PROCEDURE — 51741 ELECTRO-UROFLOWMETRY FIRST: CPT

## 2021-03-04 PROCEDURE — 99072 ADDL SUPL MATRL&STAF TM PHE: CPT

## 2021-03-04 NOTE — HISTORY OF PRESENT ILLNESS
[Urinary Frequency] : urinary frequency [Nocturia] : nocturia [Straining] : straining [Erectile Dysfunction] : Erectile Dysfunction [FreeTextEntry1] : Jordan is a 60-year-old male who We have been following for low testosterone, Erectile dysfunction, and voiding dysfunction.\par \par Doppler study demonstrated incompetent veins. He is to followup with Dr. Wakefield and has elected to undergo inflatable penile pump placement\par \par With respect to his replacement of his testosterone, he is managed successfully of 0.6 mL q. 7 days. He is doing well and is scheduled to obtain blood work next month \par \par Concerning his voiding, he is currently managed on tamsulosin p.o. q. daily with continued nocturia and feelings of incomplete emptying.\par \par He presents today to review his voiding diary and if indicated proceed to a uroflow study\par \par  [Urinary Incontinence] : no urinary incontinence [Urinary Retention] : no urinary retention [Urinary Urgency] : no urinary urgency [Weak Stream] : no weak stream [Hematuria - Gross] : no gross hematuria

## 2021-03-04 NOTE — ASSESSMENT
[FreeTextEntry1] : We reviewed his Voiding diary and uroflow study. His voiding is bothersome despite tamsulosin. He wishes to intervene now. I recommended and he accepted that any intervention be done prior to undergoing penile implant.\par \par He will be scheduled for urodynamic testing. All aspect of this procedure were reviewed in detail with regards to preparation, outcomes, and adverse events. He will obtain a urine culture one week prior.\par \par Concerning his testosterone, he'll continue on his current regimen and follow up in April and scheduled

## 2021-03-04 NOTE — LETTER BODY
[Dear  ___] : Dear  [unfilled], [Courtesy Letter:] : I had the pleasure of seeing your patient, [unfilled], in my office today. [Please see my note below.] : Please see my note below. [Sincerely,] : Sincerely, [DrTavia  ___] : Dr. SALES [FreeTextEntry2] : Alonso Rosales MD\par [address] [facility] [address]\[place] [address]\par

## 2021-03-04 NOTE — LETTER HEADER
[FreeTextEntry3] : Keyla Garza M.D.\par Director of Urology\par [facility]\par [address] [place] [address], [address]\[place] [address]

## 2021-04-12 ENCOUNTER — RX RENEWAL (OUTPATIENT)
Age: 61
End: 2021-04-12

## 2021-04-30 ENCOUNTER — APPOINTMENT (OUTPATIENT)
Dept: UROLOGY | Facility: CLINIC | Age: 61
End: 2021-04-30
Payer: COMMERCIAL

## 2021-04-30 VITALS
TEMPERATURE: 97.7 F | HEART RATE: 88 BPM | HEIGHT: 68 IN | BODY MASS INDEX: 29.86 KG/M2 | WEIGHT: 197 LBS | DIASTOLIC BLOOD PRESSURE: 88 MMHG | SYSTOLIC BLOOD PRESSURE: 135 MMHG

## 2021-04-30 PROCEDURE — 51728 CYSTOMETROGRAM W/VP: CPT

## 2021-04-30 PROCEDURE — 99072 ADDL SUPL MATRL&STAF TM PHE: CPT

## 2021-04-30 PROCEDURE — 51797 INTRAABDOMINAL PRESSURE TEST: CPT

## 2021-04-30 PROCEDURE — 51784 ANAL/URINARY MUSCLE STUDY: CPT

## 2021-04-30 PROCEDURE — 99214 OFFICE O/P EST MOD 30 MIN: CPT | Mod: 25

## 2021-04-30 PROCEDURE — 51741 ELECTRO-UROFLOWMETRY FIRST: CPT

## 2021-04-30 PROCEDURE — 51798 US URINE CAPACITY MEASURE: CPT

## 2021-04-30 RX ORDER — DEXTROAMPHETAMINE SACCHARATE, AMPHETAMINE ASPARTATE, DEXTROAMPHETAMINE SULFATE, AND AMPHETAMINE SULFATE 7.5; 7.5; 7.5; 7.5 MG/1; MG/1; MG/1; MG/1
30 TABLET ORAL
Refills: 0 | Status: ACTIVE | COMMUNITY

## 2021-04-30 RX ORDER — ZOLPIDEM TARTRATE 10 MG/1
10 TABLET ORAL
Qty: 30 | Refills: 0 | Status: ACTIVE | COMMUNITY
Start: 2020-03-05

## 2021-04-30 NOTE — ASSESSMENT
[FreeTextEntry1] : Patient is doing well at his current dose of testosterone and will continue.  We will adjust his Arimidex to 1/2 tablet every other day, down from 1 tablet every other day as the estradiol was too low.\par \par He will get blood work in 4 weeks getting a trough and peak dose before and after the third dose and follow-up after 4 review.\par \par Concerning his voiding, his urodynamics testing demonstrated dyssynergic voiding pattern.  Recommend pelvic floor therapy.  Information provided.  He will follow up after he sees a physical therapist to discuss and review results.  He will continue tamsulosin.  If he is still having voiding dysfunction after the dyssynergia is alleviated we may need to repeat the urodynamics to see if there is good resolution objectively and persistent now prostatic obstruction or if he needs other options perhaps peripheral tibial nerve stimulation or repeat attempts at biofeedback

## 2021-04-30 NOTE — LETTER BODY
[Dear  ___] : Dear  [unfilled], [Courtesy Letter:] : I had the pleasure of seeing your patient, [unfilled], in my office today. [Please see my note below.] : Please see my note below. [Sincerely,] : Sincerely, [DrTavia  ___] : Dr. SALES [FreeTextEntry2] : Alonso Rosales MD\par [address] [facility] [address]\[place] [address]\par

## 2021-04-30 NOTE — LETTER HEADER
[FreeTextEntry3] : Keyla Garza M.D.\par Director of Urology\par [facility]\par [address] [place] [address], [address]\[place] [address]

## 2021-04-30 NOTE — HISTORY OF PRESENT ILLNESS
[Urinary Frequency] : urinary frequency [Nocturia] : nocturia [Straining] : straining [Erectile Dysfunction] : Erectile Dysfunction [FreeTextEntry1] : Jordan is a 60-year-old male who We have been following for low testosterone, Erectile dysfunction, and voiding dysfunction.\par \par Presented today for urodynamic testing, please see separate note for further discussion.\par \par Additionally patient has history of erectile dysfunction with low testosterone.\par \par Prior Doppler study demonstrated incompetent veins and he scheduled to follow-up with Dr. Wakefield to discuss IPP\par \par He has been managed on 0.6 mL of testosterone q. 7 days, along with Arimidex 1 mg every other day, with good efficacy.  He presents today to review his trough and peak blood work.\par \par \par  [Currently Experiencing ___] :  [unfilled] [Urinary Incontinence] : no urinary incontinence [Urinary Retention] : no urinary retention [Urinary Urgency] : no urinary urgency [Weak Stream] : no weak stream [Hematuria - Gross] : no gross hematuria

## 2021-04-30 NOTE — PHYSICAL EXAM
[General Appearance - Well Developed] : well developed [General Appearance - Well Nourished] : well nourished [Normal Appearance] : normal appearance [Well Groomed] : well groomed [General Appearance - In No Acute Distress] : no acute distress [Abdomen Soft] : soft [Abdomen Tenderness] : non-tender [Costovertebral Angle Tenderness] : no ~M costovertebral angle tenderness [Urethral Meatus] : meatus normal [Penis Abnormality] : normal circumcised penis [Urinary Bladder Findings] : the bladder was normal on palpation [Scrotum] : the scrotum was normal [Testes Tenderness] : no tenderness of the testes [Testes Mass (___cm)] : there were no testicular masses [Edema] : no peripheral edema [] : no respiratory distress [Respiration, Rhythm And Depth] : normal respiratory rhythm and effort [Exaggerated Use Of Accessory Muscles For Inspiration] : no accessory muscle use [Oriented To Time, Place, And Person] : oriented to person, place, and time [Affect] : the affect was normal [Mood] : the mood was normal [Not Anxious] : not anxious [Normal Station and Gait] : the gait and station were normal for the patient's age [Femoral Lymph Nodes Enlarged Bilaterally] : femoral [Inguinal Lymph Nodes Enlarged Bilaterally] : inguinal

## 2021-05-19 ENCOUNTER — RX RENEWAL (OUTPATIENT)
Age: 61
End: 2021-05-19

## 2021-06-02 ENCOUNTER — APPOINTMENT (OUTPATIENT)
Dept: UROLOGY | Facility: CLINIC | Age: 61
End: 2021-06-02
Payer: COMMERCIAL

## 2021-06-02 PROCEDURE — 99214 OFFICE O/P EST MOD 30 MIN: CPT

## 2021-06-02 NOTE — ASSESSMENT
[FreeTextEntry1] : Jordan is a 60-year-old male who was refferred by Dr Malik for penile implant \par \par He also complains of erectile dysfunction, a Doppler study by Dr Malik was done which showed been no close a dysfunction he was going to consider self injection with the ring PDE five inhibitors with a ring or combination of all three. He tells me it just not working and he does not want to try all three. If every time he wants sex he needs to use a shot of pillar ring is going to take all the pleasure out of it. \par \par Finally he has his voiding dysfunction takes flomax -- interested in urolift -- still do to flow study with dr Malik. \par \par urodynamics by Dr Garza\par urodynamics testing demonstrated dyssynergic voiding pattern. Recommend pelvic floor therapy. Information provided. He will follow up after he sees a physical therapist to discuss and review results. He will continue tamsulosin. If he is still having voiding dysfunction after the dyssynergia is alleviated we may need to repeat the urodynamics to see if there is good resolution objectively and persistent now prostatic obstruction or if he needs other options perhaps peripheral tibial nerve stimulation or repeat attempts at biofeedback. \par

## 2021-06-11 ENCOUNTER — EMERGENCY (EMERGENCY)
Facility: HOSPITAL | Age: 61
LOS: 0 days | Discharge: HOME | End: 2021-06-11
Attending: EMERGENCY MEDICINE | Admitting: EMERGENCY MEDICINE
Payer: MEDICAID

## 2021-06-11 VITALS
RESPIRATION RATE: 18 BRPM | DIASTOLIC BLOOD PRESSURE: 80 MMHG | HEIGHT: 68 IN | HEART RATE: 85 BPM | TEMPERATURE: 98 F | SYSTOLIC BLOOD PRESSURE: 150 MMHG | OXYGEN SATURATION: 100 %

## 2021-06-11 VITALS — SYSTOLIC BLOOD PRESSURE: 142 MMHG | DIASTOLIC BLOOD PRESSURE: 85 MMHG | HEART RATE: 82 BPM

## 2021-06-11 DIAGNOSIS — L98.491 NON-PRESSURE CHRONIC ULCER OF SKIN OF OTHER SITES LIMITED TO BREAKDOWN OF SKIN: ICD-10-CM

## 2021-06-11 DIAGNOSIS — E78.00 PURE HYPERCHOLESTEROLEMIA, UNSPECIFIED: ICD-10-CM

## 2021-06-11 PROCEDURE — 99283 EMERGENCY DEPT VISIT LOW MDM: CPT

## 2021-06-11 NOTE — ED ADULT NURSE NOTE - NS PRO PASSIVE SMOKE EXP
no lesions,  no deformities,  no traumatic injuries,  no significant scars are present,  chest wall non-tender,  no masses present,  tactile fremitus is normal, breathing is unlabored without accessory muscle use,normal breath sounds
No

## 2021-06-11 NOTE — ED PROVIDER NOTE - CARE PROVIDER_API CALL
Jordan Cueto)  Surgery  [address] [facility]  [place] [address]  Phone: [phone]  Fax: [phone]  Follow Up Time: 7-10 Days

## 2021-06-11 NOTE — ED PROVIDER NOTE - ATTENDING CONTRIBUTION TO CARE
Pt is a 61yo male with boil to abdomen that he tried to antoinette with needle and picked open 5d ago.  Nof ever, no vomiting, no diarrhea.  No drainage so came to ED.    Exam: open wound, marginal erythema, no warmth or tenderness or discharge, no fluctuance, NAD  Plan: abx, local wound care

## 2021-06-11 NOTE — ED PROVIDER NOTE - NS ED ROS FT
Constitutional: (-) fever  Skin: (+) ? abscess  RLQ  Muskuloskeletal: (-) swelling  Neurological: (-) altered mental status

## 2021-06-11 NOTE — ED PROVIDER NOTE - OBJECTIVE STATEMENT
59 yo M hx of BP, high cholesterol c/o ?abscess RLQ x5 days. Patient thought he had an ingrown hair. 3 days ago he tried to dig it out with a needle. He self started Pen VK 2 days ago without improvement. No fevers.

## 2021-06-11 NOTE — ED PROVIDER NOTE - NSFOLLOWUPINSTRUCTIONS_ED_ALL_ED_FT
Clean your wound with soap and water and change dressings daily.  Use topical Neosporin/Bacitracin.  Follow up with a surgeon.

## 2021-06-11 NOTE — ED ADULT NURSE NOTE - LATERALITY
I'd recommend moisturizing alone first as you'd recommended.  If the baby is otherwise feeling well, feeding normally, and doesn't have a fever, can watch for a few days.  If it's worsening between today and tomorrow, I'd recommend an appointment - thanks   right

## 2021-06-11 NOTE — ED PROVIDER NOTE - PATIENT PORTAL LINK FT
You can access the FollowMyHealth Patient Portal offered by [facility] by registering at the following website: [email or web address]. By joining [employer]’s FollowMyHealth portal, you will also be able to view your health information using other applications (apps) compatible with our system.

## 2021-06-17 ENCOUNTER — APPOINTMENT (OUTPATIENT)
Dept: UROLOGY | Facility: CLINIC | Age: 61
End: 2021-06-17
Payer: COMMERCIAL

## 2021-06-17 VITALS
DIASTOLIC BLOOD PRESSURE: 86 MMHG | HEART RATE: 92 BPM | SYSTOLIC BLOOD PRESSURE: 139 MMHG | BODY MASS INDEX: 28.79 KG/M2 | WEIGHT: 190 LBS | HEIGHT: 68 IN | TEMPERATURE: 97 F

## 2021-06-17 PROCEDURE — 99214 OFFICE O/P EST MOD 30 MIN: CPT

## 2021-06-17 NOTE — HISTORY OF PRESENT ILLNESS
[Currently Experiencing ___] :  [unfilled] [Urinary Frequency] : urinary frequency [Nocturia] : nocturia [Straining] : straining [Erectile Dysfunction] : Erectile Dysfunction [FreeTextEntry1] : Jordan is a 60-year-old male we have been following for several years he has low testosterone and hyper conversion being treated with testosterone cypionate and Arimidex.  He supposed to be taking 0.65 mL - 130 mg of testosterone cypionate every 7 days and a half a milligram of Arimidex every other day.  He tells me that sometimes he forgets then he will shoot on day 9 and then the next week he will continue with a 14 so is actually taking it only 5 days afterwards etc.  As well he is having trouble cutting the Arimidex so instead of taking a half every other day he might take a whole every 3 to 4 days.  He had blood test done on May 25 May 27th and is here for review.  Please note he is already seen Dr. Wakefield is going to be going for an implant and had questions about the risks of surgery\par \par as far as his urination he feels he is comfortable enough does not want to consider any sort of surgery and he is going to go ahead with the implant [Urinary Incontinence] : no urinary incontinence [Urinary Retention] : no urinary retention [Urinary Urgency] : no urinary urgency [Weak Stream] : no weak stream [Hematuria - Gross] : no gross hematuria

## 2021-06-17 NOTE — LETTER HEADER
[FreeTextEntry3] : Keyla Garza M.D.\par Director of Urology\par [facility]\par [address] [place] [address], [address]\[place] [address]

## 2021-06-17 NOTE — PHYSICAL EXAM
[General Appearance - Well Developed] : well developed [General Appearance - Well Nourished] : well nourished [Normal Appearance] : normal appearance [Well Groomed] : well groomed [General Appearance - In No Acute Distress] : no acute distress [Abdomen Tenderness] : non-tender [Abdomen Soft] : soft [Costovertebral Angle Tenderness] : no ~M costovertebral angle tenderness [Heart Rate And Rhythm] : Heart rate and rhythm were normal [Edema] : no peripheral edema [] : no respiratory distress [Respiration, Rhythm And Depth] : normal respiratory rhythm and effort [Exaggerated Use Of Accessory Muscles For Inspiration] : no accessory muscle use [Oriented To Time, Place, And Person] : oriented to person, place, and time [Auscultation Breath Sounds / Voice Sounds] : lungs were clear to auscultation bilaterally [Affect] : the affect was normal [Mood] : the mood was normal [Not Anxious] : not anxious [Normal Station and Gait] : the gait and station were normal for the patient's age [FreeTextEntry1] : Grossly intact

## 2021-06-17 NOTE — ASSESSMENT
[FreeTextEntry1] : His levels appear to be about 50% 2-the P.  Either he uses it less often allowing it to drop down more or he takes in a lot less and he is not willing to do either.  He is fairly convinced that the problem here was they were mixing up the days doing it too soon or too early.  We will give him another month to do it religiously every 7 days if his levels are still too high then we will either decrease the dose and/or increase the interval\par \par As far as his questions of the risk of surgery I went over with Dr. Wakefield had a really documented that he told him.  Risks include eventual breakage over time it is a mechanical device, the penis may appear to shorten somewhat from scar, there can be erosion, it can get infected either acutely or over time, as well as the risks of anesthesia.  He understand had no more questions and is waiting scheduling to schedule him

## 2021-06-17 NOTE — PHYSICAL EXAM
[General Appearance - Well Developed] : well developed [General Appearance - Well Nourished] : well nourished [Normal Appearance] : normal appearance [Well Groomed] : well groomed [General Appearance - In No Acute Distress] : no acute distress [Abdomen Soft] : soft [Abdomen Tenderness] : non-tender [Costovertebral Angle Tenderness] : no ~M costovertebral angle tenderness [Heart Rate And Rhythm] : Heart rate and rhythm were normal [Edema] : no peripheral edema [] : no respiratory distress [Respiration, Rhythm And Depth] : normal respiratory rhythm and effort [Exaggerated Use Of Accessory Muscles For Inspiration] : no accessory muscle use [Auscultation Breath Sounds / Voice Sounds] : lungs were clear to auscultation bilaterally [Oriented To Time, Place, And Person] : oriented to person, place, and time [Affect] : the affect was normal [Mood] : the mood was normal [Not Anxious] : not anxious [Normal Station and Gait] : the gait and station were normal for the patient's age [FreeTextEntry1] : Grossly intact

## 2021-06-17 NOTE — LETTER BODY
[Dear  ___] : Dear  [unfilled], [Courtesy Letter:] : I had the pleasure of seeing your patient, [unfilled], in my office today. [Please see my note below.] : Please see my note below. [FreeTextEntry2] : Alonso Rosales MD\par [address] [facility] [address]\[place] [address]\par  [Sincerely,] : Sincerely, [DrTavia  ___] : Dr. SALES

## 2021-06-17 NOTE — LETTER BODY
[Dear  ___] : Dear  [unfilled], [Courtesy Letter:] : I had the pleasure of seeing your patient, [unfilled], in my office today. [Please see my note below.] : Please see my note below. [FreeTextEntry2] : Alonso Rosales MD\par [address] [facility] [address]\[place] [address]\par  [Sincerely,] : Sincerely, [DrTavia  ___] : Dr. SALES

## 2021-06-17 NOTE — LETTER HEADER
[FreeTextEntry3] : Keyla Garza M.D.\par Director of Urology\par [facility]\par [address] [place] [address], [address]\[place] [address]

## 2021-06-27 ENCOUNTER — RX RENEWAL (OUTPATIENT)
Age: 61
End: 2021-06-27

## 2021-07-07 ENCOUNTER — OUTPATIENT (OUTPATIENT)
Dept: OUTPATIENT SERVICES | Facility: HOSPITAL | Age: 61
LOS: 1 days | Discharge: HOME | End: 2021-07-07
Payer: COMMERCIAL

## 2021-07-07 VITALS
HEIGHT: 68 IN | DIASTOLIC BLOOD PRESSURE: 80 MMHG | SYSTOLIC BLOOD PRESSURE: 120 MMHG | WEIGHT: 182.1 LBS | OXYGEN SATURATION: 98 % | HEART RATE: 76 BPM | TEMPERATURE: 98 F | RESPIRATION RATE: 16 BRPM

## 2021-07-07 DIAGNOSIS — Z98.890 OTHER SPECIFIED POSTPROCEDURAL STATES: Chronic | ICD-10-CM

## 2021-07-07 DIAGNOSIS — N52.01 ERECTILE DYSFUNCTION DUE TO ARTERIAL INSUFFICIENCY: ICD-10-CM

## 2021-07-07 DIAGNOSIS — Z01.818 ENCOUNTER FOR OTHER PREPROCEDURAL EXAMINATION: ICD-10-CM

## 2021-07-07 DIAGNOSIS — Z92.89 PERSONAL HISTORY OF OTHER MEDICAL TREATMENT: Chronic | ICD-10-CM

## 2021-07-07 LAB
ALBUMIN SERPL ELPH-MCNC: 4.4 G/DL — SIGNIFICANT CHANGE UP (ref 3.5–5.2)
ALP SERPL-CCNC: 106 U/L — SIGNIFICANT CHANGE UP (ref 30–115)
ALT FLD-CCNC: 21 U/L — SIGNIFICANT CHANGE UP (ref 0–41)
ANION GAP SERPL CALC-SCNC: 8 MMOL/L — SIGNIFICANT CHANGE UP (ref 7–14)
APPEARANCE UR: CLEAR — SIGNIFICANT CHANGE UP
APTT BLD: 31.7 SEC — SIGNIFICANT CHANGE UP (ref 27–39.2)
AST SERPL-CCNC: 19 U/L — SIGNIFICANT CHANGE UP (ref 0–41)
BASOPHILS # BLD AUTO: 0.06 K/UL — SIGNIFICANT CHANGE UP (ref 0–0.2)
BASOPHILS NFR BLD AUTO: 0.9 % — SIGNIFICANT CHANGE UP (ref 0–1)
BILIRUB SERPL-MCNC: 0.4 MG/DL — SIGNIFICANT CHANGE UP (ref 0.2–1.2)
BILIRUB UR-MCNC: NEGATIVE — SIGNIFICANT CHANGE UP
BUN SERPL-MCNC: 16 MG/DL — SIGNIFICANT CHANGE UP (ref 10–20)
CALCIUM SERPL-MCNC: 9.6 MG/DL — SIGNIFICANT CHANGE UP (ref 8.5–10.1)
CHLORIDE SERPL-SCNC: 103 MMOL/L — SIGNIFICANT CHANGE UP (ref 98–110)
CO2 SERPL-SCNC: 28 MMOL/L — SIGNIFICANT CHANGE UP (ref 17–32)
COLOR SPEC: YELLOW — SIGNIFICANT CHANGE UP
CREAT SERPL-MCNC: 1.2 MG/DL — SIGNIFICANT CHANGE UP (ref 0.7–1.5)
DIFF PNL FLD: NEGATIVE — SIGNIFICANT CHANGE UP
EOSINOPHIL # BLD AUTO: 0.54 K/UL — SIGNIFICANT CHANGE UP (ref 0–0.7)
EOSINOPHIL NFR BLD AUTO: 8.1 % — HIGH (ref 0–8)
GLUCOSE SERPL-MCNC: 95 MG/DL — SIGNIFICANT CHANGE UP (ref 70–99)
GLUCOSE UR QL: NEGATIVE — SIGNIFICANT CHANGE UP
HCT VFR BLD CALC: 40.3 % — LOW (ref 42–52)
HGB BLD-MCNC: 13.2 G/DL — LOW (ref 14–18)
IMM GRANULOCYTES NFR BLD AUTO: 0.3 % — SIGNIFICANT CHANGE UP (ref 0.1–0.3)
INR BLD: 0.92 RATIO — SIGNIFICANT CHANGE UP (ref 0.65–1.3)
KETONES UR-MCNC: NEGATIVE — SIGNIFICANT CHANGE UP
LEUKOCYTE ESTERASE UR-ACNC: NEGATIVE — SIGNIFICANT CHANGE UP
LYMPHOCYTES # BLD AUTO: 1.31 K/UL — SIGNIFICANT CHANGE UP (ref 1.2–3.4)
LYMPHOCYTES # BLD AUTO: 19.7 % — LOW (ref 20.5–51.1)
MCHC RBC-ENTMCNC: 28.4 PG — SIGNIFICANT CHANGE UP (ref 27–31)
MCHC RBC-ENTMCNC: 32.8 G/DL — SIGNIFICANT CHANGE UP (ref 32–37)
MCV RBC AUTO: 86.9 FL — SIGNIFICANT CHANGE UP (ref 80–94)
MONOCYTES # BLD AUTO: 0.58 K/UL — SIGNIFICANT CHANGE UP (ref 0.1–0.6)
MONOCYTES NFR BLD AUTO: 8.7 % — SIGNIFICANT CHANGE UP (ref 1.7–9.3)
NEUTROPHILS # BLD AUTO: 4.13 K/UL — SIGNIFICANT CHANGE UP (ref 1.4–6.5)
NEUTROPHILS NFR BLD AUTO: 62.3 % — SIGNIFICANT CHANGE UP (ref 42.2–75.2)
NITRITE UR-MCNC: NEGATIVE — SIGNIFICANT CHANGE UP
NRBC # BLD: 0 /100 WBCS — SIGNIFICANT CHANGE UP (ref 0–0)
PH UR: 6.5 — SIGNIFICANT CHANGE UP (ref 5–8)
PLATELET # BLD AUTO: 387 K/UL — SIGNIFICANT CHANGE UP (ref 130–400)
POTASSIUM SERPL-MCNC: 4.4 MMOL/L — SIGNIFICANT CHANGE UP (ref 3.5–5)
POTASSIUM SERPL-SCNC: 4.4 MMOL/L — SIGNIFICANT CHANGE UP (ref 3.5–5)
PROT SERPL-MCNC: 7.1 G/DL — SIGNIFICANT CHANGE UP (ref 6–8)
PROT UR-MCNC: SIGNIFICANT CHANGE UP
PROTHROM AB SERPL-ACNC: 10.6 SEC — SIGNIFICANT CHANGE UP (ref 9.95–12.87)
RBC # BLD: 4.64 M/UL — LOW (ref 4.7–6.1)
RBC # FLD: 14.4 % — SIGNIFICANT CHANGE UP (ref 11.5–14.5)
SODIUM SERPL-SCNC: 139 MMOL/L — SIGNIFICANT CHANGE UP (ref 135–146)
SP GR SPEC: 1.02 — SIGNIFICANT CHANGE UP (ref 1.01–1.03)
UROBILINOGEN FLD QL: SIGNIFICANT CHANGE UP
WBC # BLD: 6.64 K/UL — SIGNIFICANT CHANGE UP (ref 4.8–10.8)
WBC # FLD AUTO: 6.64 K/UL — SIGNIFICANT CHANGE UP (ref 4.8–10.8)

## 2021-07-07 PROCEDURE — 93010 ELECTROCARDIOGRAM REPORT: CPT

## 2021-07-07 NOTE — H&P PST ADULT - NSICDXPASTSURGICALHX_GEN_ALL_CORE_FT
PAST SURGICAL HISTORY:  No significant past surgical history      PAST SURGICAL HISTORY:  History of dental surgery      PAST SURGICAL HISTORY:  History of dental surgery     History of other surgery rapid detox procedure for opiates under anesthesia 3 yrs ago

## 2021-07-07 NOTE — H&P PST ADULT - NSICDXPASTMEDICALHX_GEN_ALL_CORE_FT
PAST MEDICAL HISTORY:  No pertinent past medical history      PAST MEDICAL HISTORY:  HTN (hypertension)     Hypercholesteremia

## 2021-07-07 NOTE — H&P PST ADULT - HISTORY OF PRESENT ILLNESS
59 Y/O MALE PT TO PAST WITH C/O ERECTILE DYSFUNCTION PT NOW FOR SCHEDULED PROCEDURE ( IMPLANATATION INFLATABLE PENILE  PROSTHESIS ) . PT DENIES ANY CP SOB PALP COUGH DYSURIA FEVER URI. PT ABLE TO HEATHER 1-2 FOS W/O SOB  pt denies any covid s/s, or tested positive in the past  pt advised self quarantine till day of procedure  Anesthesia Alert  NO--Difficult Airway  NO--History of neck surgery or radiation  NO--Limited ROM of neck  NO--History of Malignant hyperthermia  NO--Personal or family history of Pseudocholinesterase deficiency.  NO--Prior Anesthesia Complication  NO--Latex Allergy  NO--Loose teeth  NO--History of Rheumatoid Arthritis  NO--MARTÍNEZ  NO--Bleeding risk  NO--Other_____     61 Y/O MALE PT TO PAST WITH C/O ERECTILE DYSFUNCTION PAST 2-3  YRS, WORSENING IN SEVERITY PT NOW FOR SCHEDULED PROCEDURE ( IMPLANATATION INFLATABLE PENILE  PROSTHESIS ) . PT DENIES ANY CP SOB PALP COUGH DYSURIA FEVER URI. PT ABLE TO HEATHER 1-2 FOS W/O SOB  pt denies any covid s/s, or tested positive in the past  pt advised self quarantine till day of procedure  Anesthesia Alert  NO--Difficult Airway  NO--History of neck surgery or radiation  NO--Limited ROM of neck  NO--History of Malignant hyperthermia  NO--Personal or family history of Pseudocholinesterase deficiency.  NO--Prior Anesthesia Complication  NO--Latex Allergy  NO--Loose teeth  NO--History of Rheumatoid Arthritis  NO--MARTÍNEZ  NO--Bleeding risk  NO--Other_____

## 2021-07-08 LAB
CULTURE RESULTS: SIGNIFICANT CHANGE UP
SPECIMEN SOURCE: SIGNIFICANT CHANGE UP

## 2021-07-16 ENCOUNTER — APPOINTMENT (OUTPATIENT)
Dept: UROLOGY | Facility: CLINIC | Age: 61
End: 2021-07-16
Payer: COMMERCIAL

## 2021-07-16 VITALS
SYSTOLIC BLOOD PRESSURE: 133 MMHG | BODY MASS INDEX: 28.79 KG/M2 | TEMPERATURE: 98 F | DIASTOLIC BLOOD PRESSURE: 70 MMHG | HEIGHT: 68 IN | WEIGHT: 190 LBS

## 2021-07-16 PROCEDURE — 99213 OFFICE O/P EST LOW 20 MIN: CPT

## 2021-07-16 NOTE — ASSESSMENT
[FreeTextEntry1] : Jordan is a 60-year-old male who was refferred by Dr Malik for penile implant \par \par He also complains of erectile dysfunction, a Doppler study by Dr Malik was done which showed been no close a dysfunction he was going to consider self injection with the ring PDE five inhibitors with a ring or combination of all three. He tells me it just not working and he does not want to try all three. If every time he wants sex he needs to use a shot of pillar ring is going to take all the pleasure out of it. \par \par Finally he has his voiding dysfunction takes flomax -- interested in urolift -- still do to flow study with dr Malik. \par \par Patient would like to proceed with implantation of inflatable penile prosthesis.  We discussed other options for treatment including injecting trimix, vacuum erection device but would like to proceed with implantation.  Aware of risks and benefits of the procedure including risk of infection and erosion of the device that may result in the need to remove the device with possible reimplantation in the future. Also aware that there will be pain and swelling to the penis and scrotum after the surgery.  Also aware that there are risks to the anesthesia including death. Also aware of the risk of having the perception of a shortened penis after the procedure.  He will not be allowed to have sex at least until 4 weeks after the surgery. \par \par Arrangements for surgery -- shceduled for next week

## 2021-07-16 NOTE — HISTORY OF PRESENT ILLNESS
[FreeTextEntry1] : Jordan is a 60-year-old male who was refferred by Dr Malik for penile implant \par \par He also complains of erectile dysfunction, a Doppler study by Dr Malik was done which showed been no close a dysfunction he was going to consider self injection with the ring PDE five inhibitors with a ring or combination of all three. He tells me it just not working and he does not want to try all three. If every time he wants sex he needs to use a shot of pillar ring is going to take all the pleasure out of it. \par \par Finally he has his voiding dysfunction takes flomax -- interested in urolift -- still do to flow study with dr Malik. \par \par Patient would like to proceed with implantation of inflatable penile prosthesis.  We discussed other options for treatment including injecting trimix, vacuum erection device but would like to proceed with implantation.  Aware of risks and benefits of the procedure including risk of infection and erosion of the device that may result in the need to remove the device with possible reimplantation in the future. Also aware that there will be pain and swelling to the penis and scrotum after the surgery.  Also aware that there are risks to the anesthesia including death. Also aware of the risk of having the perception of a shortened penis after the procedure.  He will not be allowed to have sex at least until 4 weeks after the surgery. \par \par Arrangements for surgery -- shceduled for next week \par

## 2021-07-19 ENCOUNTER — APPOINTMENT (OUTPATIENT)
Dept: UROLOGY | Facility: CLINIC | Age: 61
End: 2021-07-19

## 2021-07-22 ENCOUNTER — APPOINTMENT (OUTPATIENT)
Dept: UROLOGY | Facility: HOSPITAL | Age: 61
End: 2021-07-22

## 2021-07-22 ENCOUNTER — RX RENEWAL (OUTPATIENT)
Age: 61
End: 2021-07-22

## 2021-07-22 ENCOUNTER — OUTPATIENT (OUTPATIENT)
Dept: OUTPATIENT SERVICES | Facility: HOSPITAL | Age: 61
LOS: 1 days | Discharge: HOME | End: 2021-07-22
Payer: COMMERCIAL

## 2021-07-22 VITALS
OXYGEN SATURATION: 97 % | RESPIRATION RATE: 16 BRPM | SYSTOLIC BLOOD PRESSURE: 120 MMHG | HEART RATE: 86 BPM | DIASTOLIC BLOOD PRESSURE: 69 MMHG

## 2021-07-22 VITALS
SYSTOLIC BLOOD PRESSURE: 120 MMHG | WEIGHT: 182.98 LBS | TEMPERATURE: 98 F | DIASTOLIC BLOOD PRESSURE: 81 MMHG | HEIGHT: 68 IN | HEART RATE: 85 BPM | RESPIRATION RATE: 18 BRPM | OXYGEN SATURATION: 99 %

## 2021-07-22 DIAGNOSIS — Z98.890 OTHER SPECIFIED POSTPROCEDURAL STATES: Chronic | ICD-10-CM

## 2021-07-22 DIAGNOSIS — Z92.89 PERSONAL HISTORY OF OTHER MEDICAL TREATMENT: Chronic | ICD-10-CM

## 2021-07-22 PROCEDURE — 54405 INSERT MULTI-COMP PENIS PROS: CPT

## 2021-07-22 PROCEDURE — 54405 INSERT MULTI-COMP PENIS PROS: CPT | Mod: AS

## 2021-07-22 RX ORDER — HYDROMORPHONE HYDROCHLORIDE 2 MG/ML
1 INJECTION INTRAMUSCULAR; INTRAVENOUS; SUBCUTANEOUS
Refills: 0 | Status: DISCONTINUED | OUTPATIENT
Start: 2021-07-22 | End: 2021-07-22

## 2021-07-22 RX ORDER — ACETAMINOPHEN 500 MG
2 TABLET ORAL
Qty: 112 | Refills: 0
Start: 2021-07-22 | End: 2021-08-04

## 2021-07-22 RX ORDER — OXYCODONE HYDROCHLORIDE 5 MG/1
10 TABLET ORAL ONCE
Refills: 0 | Status: DISCONTINUED | OUTPATIENT
Start: 2021-07-22 | End: 2021-07-22

## 2021-07-22 RX ORDER — GABAPENTIN 400 MG/1
1 CAPSULE ORAL
Qty: 21 | Refills: 0
Start: 2021-07-22 | End: 2021-07-28

## 2021-07-22 RX ORDER — DOCUSATE SODIUM 100 MG
1 CAPSULE ORAL
Qty: 42 | Refills: 0
Start: 2021-07-22 | End: 2021-08-04

## 2021-07-22 RX ORDER — CELECOXIB 200 MG/1
200 CAPSULE ORAL ONCE
Refills: 0 | Status: COMPLETED | OUTPATIENT
Start: 2021-07-22 | End: 2021-07-22

## 2021-07-22 RX ORDER — SODIUM CHLORIDE 9 MG/ML
1000 INJECTION, SOLUTION INTRAVENOUS
Refills: 0 | Status: DISCONTINUED | OUTPATIENT
Start: 2021-07-22 | End: 2021-07-22

## 2021-07-22 RX ORDER — VANCOMYCIN HCL 1 G
1000 VIAL (EA) INTRAVENOUS ONCE
Refills: 0 | Status: COMPLETED | OUTPATIENT
Start: 2021-07-22 | End: 2021-07-22

## 2021-07-22 RX ORDER — ONDANSETRON 8 MG/1
4 TABLET, FILM COATED ORAL ONCE
Refills: 0 | Status: DISCONTINUED | OUTPATIENT
Start: 2021-07-22 | End: 2021-07-22

## 2021-07-22 RX ORDER — OXYCODONE HYDROCHLORIDE 5 MG/1
1 TABLET ORAL
Qty: 12 | Refills: 0
Start: 2021-07-22 | End: 2021-07-24

## 2021-07-22 RX ORDER — ACETAMINOPHEN 500 MG
975 TABLET ORAL ONCE
Refills: 0 | Status: COMPLETED | OUTPATIENT
Start: 2021-07-22 | End: 2021-07-22

## 2021-07-22 RX ORDER — IBUPROFEN 200 MG
1 TABLET ORAL
Qty: 56 | Refills: 0
Start: 2021-07-22 | End: 2021-08-04

## 2021-07-22 RX ORDER — HYDROMORPHONE HYDROCHLORIDE 2 MG/ML
0.5 INJECTION INTRAMUSCULAR; INTRAVENOUS; SUBCUTANEOUS
Refills: 0 | Status: DISCONTINUED | OUTPATIENT
Start: 2021-07-22 | End: 2021-07-22

## 2021-07-22 RX ADMIN — HYDROMORPHONE HYDROCHLORIDE 0.5 MILLIGRAM(S): 2 INJECTION INTRAMUSCULAR; INTRAVENOUS; SUBCUTANEOUS at 16:50

## 2021-07-22 RX ADMIN — HYDROMORPHONE HYDROCHLORIDE 1 MILLIGRAM(S): 2 INJECTION INTRAMUSCULAR; INTRAVENOUS; SUBCUTANEOUS at 16:22

## 2021-07-22 RX ADMIN — HYDROMORPHONE HYDROCHLORIDE 1 MILLIGRAM(S): 2 INJECTION INTRAMUSCULAR; INTRAVENOUS; SUBCUTANEOUS at 16:48

## 2021-07-22 RX ADMIN — OXYCODONE HYDROCHLORIDE 10 MILLIGRAM(S): 5 TABLET ORAL at 17:30

## 2021-07-22 RX ADMIN — Medication 975 MILLIGRAM(S): at 12:49

## 2021-07-22 RX ADMIN — HYDROMORPHONE HYDROCHLORIDE 0.5 MILLIGRAM(S): 2 INJECTION INTRAMUSCULAR; INTRAVENOUS; SUBCUTANEOUS at 18:00

## 2021-07-22 RX ADMIN — Medication 250 MILLIGRAM(S): at 12:52

## 2021-07-22 RX ADMIN — OXYCODONE HYDROCHLORIDE 10 MILLIGRAM(S): 5 TABLET ORAL at 18:00

## 2021-07-22 RX ADMIN — CELECOXIB 200 MILLIGRAM(S): 200 CAPSULE ORAL at 12:51

## 2021-07-22 NOTE — ASU DISCHARGE PLAN (ADULT/PEDIATRIC) - CARE PROVIDER_API CALL
Duy Wakefield  UROLOGY  [address] [place] [address], [address]  [place] [address]  Phone: [phone]  Fax: [phone]  Established Patient  Follow Up Time:

## 2021-07-22 NOTE — ASU PATIENT PROFILE, ADULT - PSH
History of dental surgery    History of other surgery  rapid detox procedure for opiates under anesthesia 3 yrs ago

## 2021-07-22 NOTE — CHART NOTE - NSCHARTNOTEFT_GEN_A_CORE
PACU ANESTHESIA ADMISSION NOTE      Procedure:   Post op diagnosis:      ____  Intubated  TV:______       Rate: ______      FiO2: ______    __x__  Patent Airway    __x__  Full return of protective reflexes    __x__  Full recovery from anesthesia / back to baseline     Vitals:   T:  97.9         R:16                  BP: 108/72                 Sat:  98                 P: 89      Mental Status:  _x___ Awake   __x___ Alert   _____ Drowsy   _____ Sedated    Nausea/Vomiting:  _x___ NO  ______Yes,   __x__ See Post - Op Orders          Pain Scale (0-10):  __0___    Treatment: ____ None    __x__ See Post - Op/PCA Orders    Post - Operative Fluids:   ____ Oral   __x__ See Post - Op Orders    Plan: Discharge:   _x___Home       _____Floor     _____Critical Care    _____  Other:_________________    Comments: When parameters met.

## 2021-07-22 NOTE — BRIEF OPERATIVE NOTE - COMMENTS
I, Frank Mathew PA-C, performed the duties of first assist during the above listed surgery which includes, but is not limited to, retraction, suctioning and suturing. There was no competent resident available for the case.

## 2021-07-23 ENCOUNTER — NON-APPOINTMENT (OUTPATIENT)
Age: 61
End: 2021-07-23

## 2021-07-23 ENCOUNTER — APPOINTMENT (OUTPATIENT)
Dept: UROLOGY | Facility: CLINIC | Age: 61
End: 2021-07-23
Payer: COMMERCIAL

## 2021-07-23 PROBLEM — E78.00 PURE HYPERCHOLESTEROLEMIA, UNSPECIFIED: Chronic | Status: ACTIVE | Noted: 2021-07-07

## 2021-07-23 PROBLEM — I10 ESSENTIAL (PRIMARY) HYPERTENSION: Chronic | Status: ACTIVE | Noted: 2021-07-07

## 2021-07-23 PROCEDURE — 99024 POSTOP FOLLOW-UP VISIT: CPT

## 2021-07-23 PROCEDURE — 99214 OFFICE O/P EST MOD 30 MIN: CPT | Mod: 24

## 2021-07-23 RX ORDER — SILDENAFIL 100 MG/1
100 TABLET, FILM COATED ORAL
Qty: 60 | Refills: 1 | Status: COMPLETED | COMMUNITY
End: 2021-07-23

## 2021-07-23 RX ORDER — CEFPODOXIME PROXETIL 200 MG/1
200 TABLET, FILM COATED ORAL
Qty: 6 | Refills: 0 | Status: COMPLETED | COMMUNITY
Start: 2021-04-30 | End: 2021-07-23

## 2021-07-23 NOTE — PHYSICAL EXAM
[Penis Abnormality] : normal circumcised penis [Scrotum] : the scrotum was normal [No Focal Deficits] : no focal deficits [General Appearance - Well Developed] : well developed [General Appearance - Well Nourished] : well nourished [Normal Appearance] : normal appearance [Well Groomed] : well groomed [General Appearance - In No Acute Distress] : no acute distress [Abdomen Soft] : soft [Abdomen Tenderness] : non-tender [Costovertebral Angle Tenderness] : no ~M costovertebral angle tenderness [Heart Rate And Rhythm] : Heart rate and rhythm were normal [Edema] : no peripheral edema [] : no respiratory distress [Respiration, Rhythm And Depth] : normal respiratory rhythm and effort [Exaggerated Use Of Accessory Muscles For Inspiration] : no accessory muscle use [Auscultation Breath Sounds / Voice Sounds] : lungs were clear to auscultation bilaterally [Oriented To Time, Place, And Person] : oriented to person, place, and time [Affect] : the affect was normal [Mood] : the mood was normal [Not Anxious] : not anxious [Normal Station and Gait] : the gait and station were normal for the patient's age [FreeTextEntry1] : CHERRIE drain site clean without signs of infection. No signs of infection suprapubic incision site.

## 2021-07-26 DIAGNOSIS — N52.9 MALE ERECTILE DYSFUNCTION, UNSPECIFIED: ICD-10-CM

## 2021-07-26 DIAGNOSIS — E78.00 PURE HYPERCHOLESTEROLEMIA, UNSPECIFIED: ICD-10-CM

## 2021-07-26 DIAGNOSIS — I10 ESSENTIAL (PRIMARY) HYPERTENSION: ICD-10-CM

## 2021-07-28 ENCOUNTER — APPOINTMENT (OUTPATIENT)
Dept: UROLOGY | Facility: CLINIC | Age: 61
End: 2021-07-28
Payer: COMMERCIAL

## 2021-07-28 PROCEDURE — 99213 OFFICE O/P EST LOW 20 MIN: CPT | Mod: 24

## 2021-07-28 NOTE — HISTORY OF PRESENT ILLNESS
[FreeTextEntry1] : OSVALDO ESPINOZA is a 60 year male presenting 1 week after IPP\par pain controlled\par expected swelling and bruising\par today the implant was manipulated and ajusted -- it was deflated all the way \par no fevers or other signs of infection\par  \par

## 2021-08-11 ENCOUNTER — APPOINTMENT (OUTPATIENT)
Dept: UROLOGY | Facility: CLINIC | Age: 61
End: 2021-08-11
Payer: COMMERCIAL

## 2021-08-11 VITALS — WEIGHT: 190 LBS | BODY MASS INDEX: 28.79 KG/M2 | HEIGHT: 68 IN

## 2021-08-11 DIAGNOSIS — N48.89 OTHER SPECIFIED DISORDERS OF PENIS: ICD-10-CM

## 2021-08-11 DIAGNOSIS — Z45.89 ENCOUNTER FOR ADJUSTMENT AND MANAGEMENT OF OTHER IMPLANTED DEVICES: ICD-10-CM

## 2021-08-11 PROCEDURE — 99024 POSTOP FOLLOW-UP VISIT: CPT

## 2021-08-11 PROCEDURE — 99213 OFFICE O/P EST LOW 20 MIN: CPT | Mod: 24

## 2021-08-23 NOTE — LETTER BODY
[Dear  ___] : Dear  [unfilled], [Courtesy Letter:] : I had the pleasure of seeing your patient, [unfilled], in my office today. [Please see my note below.] : Please see my note below. [Sincerely,] : Sincerely, [DrTavia  ___] : Dr. SALES [FreeTextEntry2] : Alonso Rosales MD\par [address] [facility] [address]\[place] [address]\par

## 2021-08-23 NOTE — ASSESSMENT
[FreeTextEntry1] : trough high for trough but acceptable while the peak is really not that much higher.  He does acknowledge 2 things 1 the lab screwed up the trough so instead of getting it at 7 days that week he did it 9 days.  #2 instead of injecting 0.65 there was only 0.6 left.  Over really seeing therefore is a nonrepresentative sample once again.  If he is doing it every 9 days and is having these numbers I think he should stay at every 9 days has these numbers are good.\par \par I am therefore going to change him to 0.65 every 9 days instead of every 7 we will get blood drawn in a month hopefully he will stay specifically at every 9 days, get the blood drawn when he supposed to and if this time is good we can go back to every 3-month or so.  He will also drop to 0.6 instead of 0.65

## 2021-08-23 NOTE — LETTER BODY
[Dear  ___] : Dear  [unfilled], [Courtesy Letter:] : I had the pleasure of seeing your patient, [unfilled], in my office today. [Please see my note below.] : Please see my note below. [Sincerely,] : Sincerely, [DrTavia  ___] : Dr. SALES [FreeTextEntry2] : Alonso Rosales MD\par [address] [facility] [address]\[place] [address]\par

## 2021-08-23 NOTE — LETTER HEADER
[FreeTextEntry3] : Keyla Garza M.D.\par Director of Urology\par [facility]\par [address] [place] [address], [address]\[place] [address]

## 2021-08-23 NOTE — HISTORY OF PRESENT ILLNESS
[Currently Experiencing ___] :  [unfilled] [Urinary Frequency] : urinary frequency [Nocturia] : nocturia [Straining] : straining [FreeTextEntry1] : last seen 06/17/2021\par \par blood levels peak were high and trough normal; but high for trough\par \par he felt he was injecting too early when he missed the week before to Make up for it\par \par we gave him another month to do it religiously and then see\par \par \par in the interim he had the surgery  saw dr maher post op and is seeing me to manage the TRT [Urinary Incontinence] : no urinary incontinence [Urinary Retention] : no urinary retention [Urinary Urgency] : no urinary urgency [Weak Stream] : no weak stream [Hematuria - Gross] : no gross hematuria

## 2021-08-23 NOTE — LETTER HEADER
[FreeTextEntry3] : Keyla Garza M.D.\par Director of Urology\par [facility]\par [address] [place] [address], [address]\[place] [address]

## 2021-08-25 ENCOUNTER — APPOINTMENT (OUTPATIENT)
Dept: UROLOGY | Facility: CLINIC | Age: 61
End: 2021-08-25
Payer: COMMERCIAL

## 2021-08-25 VITALS — HEIGHT: 68 IN | BODY MASS INDEX: 28.79 KG/M2 | WEIGHT: 190 LBS

## 2021-08-25 PROCEDURE — 99213 OFFICE O/P EST LOW 20 MIN: CPT | Mod: 24

## 2021-08-26 ENCOUNTER — APPOINTMENT (OUTPATIENT)
Dept: UROLOGY | Facility: CLINIC | Age: 61
End: 2021-08-26

## 2021-08-30 PROBLEM — N48.89 PENILE PAIN: Status: ACTIVE | Noted: 2021-08-30

## 2021-08-30 PROBLEM — Z45.89 ENCOUNTER FOR ADJUSTMENT AND MANAGEMENT OF OTHER IMPLANTED DEVICES: Status: ACTIVE | Noted: 2021-07-28

## 2021-08-30 NOTE — PHYSICAL EXAM
[General Appearance - In No Acute Distress] : no acute distress [Penis Abnormality] : normal circumcised penis [] : no respiratory distress [Oriented To Time, Place, And Person] : oriented to person, place, and time [Normal Station and Gait] : the gait and station were normal for the patient's age [No Focal Deficits] : no focal deficits [FreeTextEntry1] : IPP placed. Mostly deflated, able to adjust penis. Able to locate device in scrotum. Surrounded by fluid making it difficult to feel deflation button.

## 2021-08-30 NOTE — ASSESSMENT
[FreeTextEntry1] : 60 year old male s/p IPP placement. \par Manipulated and adjusted IPP device today. Deflated device again. \par No fevers or other signs of infection. \par \par Will follow up next week for IPP training. Patient made aware of swelling making it difficult to locate device in scrotum.

## 2021-08-30 NOTE — HISTORY OF PRESENT ILLNESS
[FreeTextEntry1] : Jordan Phan is a 60 year old male presenting with chief complaint of discomfort in penis and scrotum. Patient has IPP placed July 22, 2021. Patient had device deflated July 28, 2021. Patient also states that the suprapubic area is swollen and he feels swelling in scrotum. \par Denies fevers or discharge or signs of infection.

## 2021-09-09 ENCOUNTER — APPOINTMENT (OUTPATIENT)
Dept: UROLOGY | Facility: CLINIC | Age: 61
End: 2021-09-09

## 2021-09-13 NOTE — ASSESSMENT
[FreeTextEntry1] : Jordan Phan is a 60 year old male presenting for training for Implantable Penile Prosthetic (adjustment of manipulation of device) that has been placed July 22, 2021. \par Patient was last seen 08/11/2021 for evaluation of post op pain and swelling. Patient reports that the pain and swelling has improved and he wants to learn how to inflate and deflate device. \par Using a visual model patient was instructed on how to inflate and deflate device. Patient demonstrated ability to inflate and deflate device and feels comfortable doing so. \par Patient will inflate and deflate device twice daily for 15 minutes to stretch skin and straighten implant. Patient able to use implant for sex. \par \par Denies fever and chills.

## 2021-09-13 NOTE — PHYSICAL EXAM
[General Appearance - In No Acute Distress] : no acute distress [Abdomen Soft] : soft [Abdomen Tenderness] : non-tender [Penis Abnormality] : normal circumcised penis [Testes Tenderness] : no tenderness of the testes [Skin Color & Pigmentation] : normal skin color and pigmentation [] : no respiratory distress [Oriented To Time, Place, And Person] : oriented to person, place, and time [Normal Station and Gait] : the gait and station were normal for the patient's age [No Focal Deficits] : no focal deficits [FreeTextEntry1] : Implantable device located in scrotum. Able to palpate pump and deflation buttons appropriately. No pain to palpation.

## 2021-09-13 NOTE — HISTORY OF PRESENT ILLNESS
[FreeTextEntry1] : Jordan Phan is a 60 year old male presenting for training for Implantable Penile Prosthetic that has been placed July 22, 2021. \par Patient was last seen 08/11/2021 for evaluation of post op pain and swelling. Patient reports that the pain and swelling has improved and he wants to learn how to inflate and deflate device. \par Using a visual model patient was instructed on how to inflate and deflate device. Patient demonstrated ability to inflate and deflate device and feels comfortable doing so. \par Patient will inflate and deflate device twice daily for 15 minutes to stretch skin and straighten implant. Patient able to use implant for sex. \par \par Denies fever and chills.

## 2021-09-20 ENCOUNTER — RX RENEWAL (OUTPATIENT)
Age: 61
End: 2021-09-20

## 2021-10-04 ENCOUNTER — RX RENEWAL (OUTPATIENT)
Age: 61
End: 2021-10-04

## 2021-10-04 ENCOUNTER — APPOINTMENT (OUTPATIENT)
Dept: UROLOGY | Facility: CLINIC | Age: 61
End: 2021-10-04
Payer: COMMERCIAL

## 2021-10-04 PROCEDURE — 99214 OFFICE O/P EST MOD 30 MIN: CPT | Mod: 95

## 2021-10-04 PROCEDURE — 99024 POSTOP FOLLOW-UP VISIT: CPT | Mod: 95

## 2021-10-04 NOTE — ASSESSMENT
[FreeTextEntry1] : His numbers are acceptable, but he is overdosing on the anastrazole and his symptoms vis a vis are doing well\par \par I will renew the meds when he runs out he will get blood tested in 2-1/2 and see me in 3 months\par \par He will try and be a little more careful with the anastrozole not to overdo it as he does need enough estradiol to keep his bones strong and his general health doing well

## 2021-10-04 NOTE — HISTORY OF PRESENT ILLNESS
[Home] : at home, [unfilled] , at the time of the visit. [Medical Office: ([facility])___] : at the medical office located in  [Currently Experiencing ___] :  [unfilled] [Urinary Frequency] : urinary frequency [Nocturia] : nocturia [Straining] : straining [Spouse] : spouse [FreeTextEntry3] : he has received, reviewed and agreed to the telemedicine consent  [FreeTextEntry1] : Jordan is a 61-year-old male born September 14, 1960 well-known to us having been treated with intramuscular testosterone for testosterone replacement therapy.  He was last seen on July 23, 2021 we adjusted his dose further to 0.60 mL - 120 mg of testosterone cypionate every 9 days he was post to get trough and peak and come back in and if everything was good he would go to every 3 months he got blood drawn in August for trough and peak on the 14th and 16.  He tells me subjectively he is doing well except he threw out his back [Urinary Incontinence] : no urinary incontinence [Urinary Retention] : no urinary retention [Urinary Urgency] : no urinary urgency [Weak Stream] : no weak stream [Hematuria - Gross] : no gross hematuria

## 2021-10-04 NOTE — PHYSICAL EXAM
[General Appearance - Well Developed] : well developed [General Appearance - Well Nourished] : well nourished [Normal Appearance] : normal appearance [Well Groomed] : well groomed [General Appearance - In No Acute Distress] : no acute distress [] : no respiratory distress [Respiration, Rhythm And Depth] : normal respiratory rhythm and effort [Exaggerated Use Of Accessory Muscles For Inspiration] : no accessory muscle use [Oriented To Time, Place, And Person] : oriented to person, place, and time [Affect] : the affect was normal [Mood] : the mood was normal [Not Anxious] : not anxious [FreeTextEntry1] : limited movement due to sciatica

## 2021-10-04 NOTE — LETTER BODY
[Dear  ___] : Dear  [unfilled], [Courtesy Letter:] : I had the pleasure of seeing your patient, [unfilled], in my office today. [Sincerely,] : Sincerely, [Please see my note below.] : Please see my note below. [DrTavia  ___] : Dr. SALES [FreeTextEntry2] : Alonso Rosales MD\par [address] [facility] [address]\[place] [address]\par

## 2021-10-04 NOTE — LETTER HEADER
[FreeTextEntry3] : Keyla Garza M.D.\par Director of Urology\par [facility]\par [address] [place] [address], [address]\[place] [address]

## 2021-11-10 ENCOUNTER — TRANSCRIPTION ENCOUNTER (OUTPATIENT)
Age: 61
End: 2021-11-10

## 2021-11-17 ENCOUNTER — APPOINTMENT (OUTPATIENT)
Dept: UROLOGY | Facility: CLINIC | Age: 61
End: 2021-11-17
Payer: COMMERCIAL

## 2021-11-17 VITALS — WEIGHT: 185 LBS | BODY MASS INDEX: 28.04 KG/M2 | HEIGHT: 68 IN

## 2021-11-17 PROCEDURE — 99213 OFFICE O/P EST LOW 20 MIN: CPT

## 2021-11-17 NOTE — ASSESSMENT
[FreeTextEntry1] : \par Jordan Phan is a 60 year old male -- Implantable Penile Prosthetic (adjustment of manipulation of device) that has been placed July 22, 2021. \par \par Patient reports that the pain and swelling has improved \par \par has been able to successfully use implant for sex happy with results\par

## 2021-11-17 NOTE — HISTORY OF PRESENT ILLNESS
[FreeTextEntry1] : \par Jrodan Phan is a 60 year old male -- Implantable Penile Prosthetic (adjustment of manipulation of device) that has been placed July 22, 2021. \par \par Patient reports that the pain and swelling has improved \par \par has been able to successfully use implant for sex happy with results\par \par

## 2021-12-29 ENCOUNTER — OUTPATIENT (OUTPATIENT)
Dept: OUTPATIENT SERVICES | Facility: HOSPITAL | Age: 61
LOS: 1 days | Discharge: HOME | End: 2021-12-29
Payer: COMMERCIAL

## 2021-12-29 VITALS
WEIGHT: 190.04 LBS | RESPIRATION RATE: 16 BRPM | DIASTOLIC BLOOD PRESSURE: 80 MMHG | SYSTOLIC BLOOD PRESSURE: 140 MMHG | HEIGHT: 69 IN | HEART RATE: 80 BPM | OXYGEN SATURATION: 98 % | TEMPERATURE: 97 F

## 2021-12-29 DIAGNOSIS — Z98.890 OTHER SPECIFIED POSTPROCEDURAL STATES: Chronic | ICD-10-CM

## 2021-12-29 DIAGNOSIS — Z01.818 ENCOUNTER FOR OTHER PREPROCEDURAL EXAMINATION: ICD-10-CM

## 2021-12-29 DIAGNOSIS — M71.38 OTHER BURSAL CYST, OTHER SITE: ICD-10-CM

## 2021-12-29 DIAGNOSIS — Z92.89 PERSONAL HISTORY OF OTHER MEDICAL TREATMENT: Chronic | ICD-10-CM

## 2021-12-29 LAB
ALBUMIN SERPL ELPH-MCNC: 4.2 G/DL — SIGNIFICANT CHANGE UP (ref 3.5–5.2)
ALP SERPL-CCNC: 105 U/L — SIGNIFICANT CHANGE UP (ref 30–115)
ALT FLD-CCNC: 22 U/L — SIGNIFICANT CHANGE UP (ref 0–41)
ANION GAP SERPL CALC-SCNC: 16 MMOL/L — HIGH (ref 7–14)
APTT BLD: 34.6 SEC — SIGNIFICANT CHANGE UP (ref 27–39.2)
AST SERPL-CCNC: 19 U/L — SIGNIFICANT CHANGE UP (ref 0–41)
BASOPHILS # BLD AUTO: 0.05 K/UL — SIGNIFICANT CHANGE UP (ref 0–0.2)
BASOPHILS NFR BLD AUTO: 0.7 % — SIGNIFICANT CHANGE UP (ref 0–1)
BILIRUB SERPL-MCNC: 0.3 MG/DL — SIGNIFICANT CHANGE UP (ref 0.2–1.2)
BLD GP AB SCN SERPL QL: SIGNIFICANT CHANGE UP
BUN SERPL-MCNC: 16 MG/DL — SIGNIFICANT CHANGE UP (ref 10–20)
CALCIUM SERPL-MCNC: 9.1 MG/DL — SIGNIFICANT CHANGE UP (ref 8.5–10.1)
CHLORIDE SERPL-SCNC: 106 MMOL/L — SIGNIFICANT CHANGE UP (ref 98–110)
CO2 SERPL-SCNC: 22 MMOL/L — SIGNIFICANT CHANGE UP (ref 17–32)
CREAT SERPL-MCNC: 0.9 MG/DL — SIGNIFICANT CHANGE UP (ref 0.7–1.5)
EOSINOPHIL # BLD AUTO: 0.41 K/UL — SIGNIFICANT CHANGE UP (ref 0–0.7)
EOSINOPHIL NFR BLD AUTO: 5.8 % — SIGNIFICANT CHANGE UP (ref 0–8)
GLUCOSE SERPL-MCNC: 80 MG/DL — SIGNIFICANT CHANGE UP (ref 70–99)
HCT VFR BLD CALC: 41.1 % — LOW (ref 42–52)
HGB BLD-MCNC: 13.1 G/DL — LOW (ref 14–18)
IMM GRANULOCYTES NFR BLD AUTO: 0.3 % — SIGNIFICANT CHANGE UP (ref 0.1–0.3)
INR BLD: 0.96 RATIO — SIGNIFICANT CHANGE UP (ref 0.65–1.3)
LYMPHOCYTES # BLD AUTO: 0.89 K/UL — LOW (ref 1.2–3.4)
LYMPHOCYTES # BLD AUTO: 12.6 % — LOW (ref 20.5–51.1)
MCHC RBC-ENTMCNC: 28.2 PG — SIGNIFICANT CHANGE UP (ref 27–31)
MCHC RBC-ENTMCNC: 31.9 G/DL — LOW (ref 32–37)
MCV RBC AUTO: 88.6 FL — SIGNIFICANT CHANGE UP (ref 80–94)
MONOCYTES # BLD AUTO: 1.17 K/UL — HIGH (ref 0.1–0.6)
MONOCYTES NFR BLD AUTO: 16.6 % — HIGH (ref 1.7–9.3)
NEUTROPHILS # BLD AUTO: 4.5 K/UL — SIGNIFICANT CHANGE UP (ref 1.4–6.5)
NEUTROPHILS NFR BLD AUTO: 64 % — SIGNIFICANT CHANGE UP (ref 42.2–75.2)
NRBC # BLD: 0 /100 WBCS — SIGNIFICANT CHANGE UP (ref 0–0)
PLATELET # BLD AUTO: 273 K/UL — SIGNIFICANT CHANGE UP (ref 130–400)
POTASSIUM SERPL-MCNC: 4.9 MMOL/L — SIGNIFICANT CHANGE UP (ref 3.5–5)
POTASSIUM SERPL-SCNC: 4.9 MMOL/L — SIGNIFICANT CHANGE UP (ref 3.5–5)
PROT SERPL-MCNC: 7 G/DL — SIGNIFICANT CHANGE UP (ref 6–8)
PROTHROM AB SERPL-ACNC: 11.1 SEC — SIGNIFICANT CHANGE UP (ref 9.95–12.87)
RBC # BLD: 4.64 M/UL — LOW (ref 4.7–6.1)
RBC # FLD: 14.9 % — HIGH (ref 11.5–14.5)
SODIUM SERPL-SCNC: 144 MMOL/L — SIGNIFICANT CHANGE UP (ref 135–146)
WBC # BLD: 7.04 K/UL — SIGNIFICANT CHANGE UP (ref 4.8–10.8)
WBC # FLD AUTO: 7.04 K/UL — SIGNIFICANT CHANGE UP (ref 4.8–10.8)

## 2021-12-29 PROCEDURE — 93010 ELECTROCARDIOGRAM REPORT: CPT

## 2021-12-29 NOTE — H&P PST ADULT - NSANTHOSAYNRD_GEN_A_CORE
No. MARTÍNEZ screening performed.  STOP BANG Legend: 0-2 = LOW Risk; 3-4 = INTERMEDIATE Risk; 5-8 = HIGH Risk

## 2021-12-29 NOTE — H&P PST ADULT - NSICDXPASTSURGICALHX_GEN_ALL_CORE_FT
PAST SURGICAL HISTORY:  History of dental surgery     History of other surgery rapid detox procedure for opiates under anesthesia 3 yrs ago    History of other surgery penile prothesis - implant 7/21

## 2021-12-29 NOTE — H&P PST ADULT - HISTORY OF PRESENT ILLNESS
62 Y/O MALE PRESENTS  TO PAST WITH C/O BACK PAIN , SHARP RADIATING TO LLE FOR      PT C/O PRESENCE OF SYNOVIAL CYST.              PT NOW FOR SCHEDULED PROCEDURE ( LEFT LUMBAR FIVE-SACRAL ONE LAMINECTOMY FOR REMOVAL OF SYNOVIAL CYST ) . PT DENIES ANY CP SOB PALP COUGH DYSURIA FEVER URI. PT ABLE TO HEATHER 1-2 FOS W/O SOB, LIMITED SECONDARY TO PAIN  pt denies any covid s/s, or tested positive in the past  pt advised self quarantine till day of procedure  Anesthesia Alert  NO--Difficult Airway  NO--History of neck surgery or radiation  NO--Limited ROM of neck  NO--History of Malignant hyperthermia  NO--Personal or family history of Pseudocholinesterase deficiency.  NO--Prior Anesthesia Complication  NO--Latex Allergy  NO--Loose teeth  NO--History of Rheumatoid Arthritis  NO--MARTÍNEZ  NO--Bleeding risk  NO--Other_____     62 Y/O MALE PRESENTS  TO PAST WITH C/O BACK PAIN , SHARP STABBING PAIN  RADIATING TO LLE FOR PAST 4 MO PT C/O PRESENCE OF SYNOVIAL CYST.  PT NOW FOR SCHEDULED PROCEDURE ( LEFT LUMBAR FIVE-SACRAL ONE LAMINECTOMY FOR REMOVAL OF SYNOVIAL CYST ) . PT DENIES ANY CP SOB PALP COUGH DYSURIA FEVER . PT C/O RUNNY NOSE SORE THROAT , STARTED THIS AM  NO FEVER OR COUGH  PT ABLE TO HEATHER 1-2 FOS W/O SOB, LIMITED SECONDARY TO PAIN   pt denies any covid s/s, or tested positive in the past  pt advised self quarantine till day of procedure  Anesthesia Alert  NO--Difficult Airway  NO--History of neck surgery or radiation  NO--Limited ROM of neck  NO--History of Malignant hyperthermia  NO--Personal or family history of Pseudocholinesterase deficiency.  NO--Prior Anesthesia Complication  NO--Latex Allergy  NO--Loose teeth  NO--History of Rheumatoid Arthritis  NO--MARTÍNEZ  NO--Bleeding risk  NO--Other_____     60 Y/O MALE PRESENTS  TO PAST WITH C/O BACK PAIN , SHARP STABBING PAIN  RADIATING TO LLE FOR PAST 4 MO PT C/O PRESENCE of SYNOVIAL CYST.  PT NOW FOR SCHEDULED PROCEDURE ( LEFT LUMBAR FIVE-SACRAL ONE LAMINECTOMY FOR REMOVAL OF SYNOVIAL CYST ) . PT DENIES ANY CP SOB PALP COUGH DYSURIA FEVER . PT C/O RUNNY NOSE SORE THROAT , STARTED THIS AM  NO FEVER OR COUGH  PT ABLE TO HEATHER 1-2 FOS W/O SOB, LIMITED SECONDARY TO PAIN   pt denies any covid s/s, or tested positive in the past  pt advised self quarantine till day of procedure  Anesthesia Alert  NO--Difficult Airway  NO--History of neck surgery or radiation  NO--Limited ROM of neck  NO--History of Malignant hyperthermia  NO--Personal or family history of Pseudocholinesterase deficiency.  NO--Prior Anesthesia Complication  NO--Latex Allergy  NO--Loose teeth  NO--History of Rheumatoid Arthritis  NO--MARTÍNEZ  NO--Bleeding risk  NO--Other_____

## 2022-01-06 ENCOUNTER — APPOINTMENT (OUTPATIENT)
Dept: UROLOGY | Facility: CLINIC | Age: 62
End: 2022-01-06
Payer: COMMERCIAL

## 2022-01-06 PROCEDURE — 99214 OFFICE O/P EST MOD 30 MIN: CPT | Mod: 95

## 2022-01-06 NOTE — HISTORY OF PRESENT ILLNESS
[Home] : at home, [unfilled] , at the time of the visit. [Medical Office: ([facility])___] : at the medical office located in  [FreeTextEntry3] : he has received, reviewed and agreed to the telemedicine consent  [FreeTextEntry1] : Jordan is a 61-year-old male born September 14, 1960 last seen with telemedicine October 4, 2021.  I been treating him for low testosterone while he had an implant done by Dr. Wakefield.  He has been using testosterone cypionate 120mg every 7  days and anastrozole 0.5 mg every 2 to 3 days sometimes he takes a whole tablet and we had discussed that he was taking too much anastrozole and he really should use the medication as prescribed.  We reviewed the medication with him to get trough and peak in 2-1/2 and see me in 3 months.\par \par \par [phone]\par \par [email or web address]\par \par Blood work in chart missing complete blood count.

## 2022-01-06 NOTE — ASSESSMENT
[FreeTextEntry1] : His numbers are good he is feeling good  though he just got over covid as he is going for back surgery Jan 12, 2022\par \par he will get a cbc for his upcoming surgery and he will send it ot me\par \par He will continue with 120 mg every 7 days, anastrozole half a tablet twice in 5 days see me in 3 months with trough and peak before he comes in

## 2022-01-06 NOTE — LETTER BODY
[Dear  ___] : Dear  [unfilled], [Courtesy Letter:] : I had the pleasure of seeing your patient, [unfilled], in my office today. [Please see my note below.] : Please see my note below. [Sincerely,] : Sincerely, [DrTavia  ___] : Dr. SALES [FreeTextEntry2] : Alonso Rosales MD\par [address] [facility] [address]\[place] [address]\par

## 2022-01-06 NOTE — LETTER HEADER
[FreeTextEntry3] : Keyla Garza M.D.\par Director Emeritus of Urology\par [facility]\par [address] [place] [address], [address]\[place] [address]

## 2022-01-06 NOTE — ADDENDUM
[FreeTextEntry1] : The patient-doctor relationship has been established in a face to face fashion via real time video/audio HIPAA compliant communication using telemedicine software.  The patient's identity has been confirmed.  The patient was previously emailed a copy of the telemedicine consent.  They have had a chance to review and has now given verbal consent and has requested care to be assessed and treated through telemedicine and understands there maybe limitations in this process and they may need further follow up care in the office and or hospital settings.   \par  \par

## 2022-01-07 ENCOUNTER — OUTPATIENT (OUTPATIENT)
Dept: OUTPATIENT SERVICES | Facility: HOSPITAL | Age: 62
LOS: 1 days | Discharge: HOME | End: 2022-01-07

## 2022-01-07 DIAGNOSIS — Z92.89 PERSONAL HISTORY OF OTHER MEDICAL TREATMENT: Chronic | ICD-10-CM

## 2022-01-07 DIAGNOSIS — Z02.9 ENCOUNTER FOR ADMINISTRATIVE EXAMINATIONS, UNSPECIFIED: ICD-10-CM

## 2022-01-07 DIAGNOSIS — Z98.890 OTHER SPECIFIED POSTPROCEDURAL STATES: Chronic | ICD-10-CM

## 2022-01-07 LAB — MRSA PCR RESULT.: POSITIVE

## 2022-01-09 ENCOUNTER — LABORATORY RESULT (OUTPATIENT)
Age: 62
End: 2022-01-09

## 2022-01-20 ENCOUNTER — RX RENEWAL (OUTPATIENT)
Age: 62
End: 2022-01-20

## 2022-01-25 NOTE — ASU PATIENT PROFILE, ADULT - PRO MENTAL HEALTH SX RECENT
This writer tried to call [facility].  Left a message that we do not have an RON and if she has one to please fax it to the office.   none

## 2022-01-25 NOTE — ASU PATIENT PROFILE, ADULT - FALL HARM RISK - UNIVERSAL INTERVENTIONS
Bed in lowest position, wheels locked, appropriate side rails in place/Call bell, personal items and telephone in reach/Instruct patient to call for assistance before getting out of bed or chair/Non-slip footwear when patient is out of bed/[place] to call system/Physically safe environment - no spills, clutter or unnecessary equipment/Purposeful Proactive Rounding/Room/bathroom lighting operational, light cord in reach

## 2022-01-26 ENCOUNTER — APPOINTMENT (OUTPATIENT)
Dept: NEUROSURGERY | Facility: HOSPITAL | Age: 62
End: 2022-01-26
Payer: COMMERCIAL

## 2022-01-26 ENCOUNTER — OUTPATIENT (OUTPATIENT)
Dept: OUTPATIENT SERVICES | Facility: HOSPITAL | Age: 62
LOS: 1 days | Discharge: HOME | End: 2022-01-26
Payer: COMMERCIAL

## 2022-01-26 ENCOUNTER — RESULT REVIEW (OUTPATIENT)
Age: 62
End: 2022-01-26

## 2022-01-26 ENCOUNTER — EMERGENCY (EMERGENCY)
Facility: HOSPITAL | Age: 62
LOS: 0 days | Discharge: HOME | End: 2022-01-26
Attending: EMERGENCY MEDICINE | Admitting: EMERGENCY MEDICINE
Payer: COMMERCIAL

## 2022-01-26 VITALS
SYSTOLIC BLOOD PRESSURE: 116 MMHG | OXYGEN SATURATION: 98 % | DIASTOLIC BLOOD PRESSURE: 75 MMHG | RESPIRATION RATE: 18 BRPM | HEART RATE: 101 BPM

## 2022-01-26 VITALS
HEIGHT: 68 IN | OXYGEN SATURATION: 97 % | SYSTOLIC BLOOD PRESSURE: 144 MMHG | DIASTOLIC BLOOD PRESSURE: 77 MMHG | HEART RATE: 116 BPM | WEIGHT: 190.04 LBS | RESPIRATION RATE: 20 BRPM

## 2022-01-26 VITALS
HEART RATE: 68 BPM | HEIGHT: 68 IN | DIASTOLIC BLOOD PRESSURE: 69 MMHG | TEMPERATURE: 98 F | OXYGEN SATURATION: 99 % | SYSTOLIC BLOOD PRESSURE: 122 MMHG | WEIGHT: 190.04 LBS | RESPIRATION RATE: 15 BRPM

## 2022-01-26 VITALS
RESPIRATION RATE: 14 BRPM | DIASTOLIC BLOOD PRESSURE: 70 MMHG | HEART RATE: 89 BPM | SYSTOLIC BLOOD PRESSURE: 100 MMHG | OXYGEN SATURATION: 95 %

## 2022-01-26 DIAGNOSIS — Z98.890 OTHER SPECIFIED POSTPROCEDURAL STATES: Chronic | ICD-10-CM

## 2022-01-26 DIAGNOSIS — E78.5 HYPERLIPIDEMIA, UNSPECIFIED: ICD-10-CM

## 2022-01-26 DIAGNOSIS — Z53.29 PROCEDURE AND TREATMENT NOT CARRIED OUT BECAUSE OF PATIENT'S DECISION FOR OTHER REASONS: ICD-10-CM

## 2022-01-26 DIAGNOSIS — Z92.89 PERSONAL HISTORY OF OTHER MEDICAL TREATMENT: Chronic | ICD-10-CM

## 2022-01-26 DIAGNOSIS — R07.89 OTHER CHEST PAIN: ICD-10-CM

## 2022-01-26 DIAGNOSIS — R20.2 PARESTHESIA OF SKIN: ICD-10-CM

## 2022-01-26 DIAGNOSIS — R00.0 TACHYCARDIA, UNSPECIFIED: ICD-10-CM

## 2022-01-26 DIAGNOSIS — I10 ESSENTIAL (PRIMARY) HYPERTENSION: ICD-10-CM

## 2022-01-26 LAB
ALBUMIN SERPL ELPH-MCNC: 4.4 G/DL — SIGNIFICANT CHANGE UP (ref 3.5–5.2)
ALP SERPL-CCNC: 100 U/L — SIGNIFICANT CHANGE UP (ref 30–115)
ALT FLD-CCNC: 22 U/L — SIGNIFICANT CHANGE UP (ref 0–41)
ANION GAP SERPL CALC-SCNC: 12 MMOL/L — SIGNIFICANT CHANGE UP (ref 7–14)
APTT BLD: 30.3 SEC — SIGNIFICANT CHANGE UP (ref 27–39.2)
AST SERPL-CCNC: 27 U/L — SIGNIFICANT CHANGE UP (ref 0–41)
BASOPHILS # BLD AUTO: 0.01 K/UL — SIGNIFICANT CHANGE UP (ref 0–0.2)
BASOPHILS NFR BLD AUTO: 0.1 % — SIGNIFICANT CHANGE UP (ref 0–1)
BILIRUB SERPL-MCNC: 0.8 MG/DL — SIGNIFICANT CHANGE UP (ref 0.2–1.2)
BLD GP AB SCN SERPL QL: SIGNIFICANT CHANGE UP
BUN SERPL-MCNC: 20 MG/DL — SIGNIFICANT CHANGE UP (ref 10–20)
CALCIUM SERPL-MCNC: 9.4 MG/DL — SIGNIFICANT CHANGE UP (ref 8.5–10.1)
CHLORIDE SERPL-SCNC: 101 MMOL/L — SIGNIFICANT CHANGE UP (ref 98–110)
CO2 SERPL-SCNC: 23 MMOL/L — SIGNIFICANT CHANGE UP (ref 17–32)
CREAT SERPL-MCNC: 1.2 MG/DL — SIGNIFICANT CHANGE UP (ref 0.7–1.5)
EOSINOPHIL # BLD AUTO: 0 K/UL — SIGNIFICANT CHANGE UP (ref 0–0.7)
EOSINOPHIL NFR BLD AUTO: 0 % — SIGNIFICANT CHANGE UP (ref 0–8)
GLUCOSE SERPL-MCNC: 132 MG/DL — HIGH (ref 70–99)
HCT VFR BLD CALC: 37.8 % — LOW (ref 42–52)
HGB BLD-MCNC: 12.2 G/DL — LOW (ref 14–18)
IMM GRANULOCYTES NFR BLD AUTO: 0.2 % — SIGNIFICANT CHANGE UP (ref 0.1–0.3)
INR BLD: 1.07 RATIO — SIGNIFICANT CHANGE UP (ref 0.65–1.3)
LACTATE SERPL-SCNC: 2.5 MMOL/L — HIGH (ref 0.7–2)
LYMPHOCYTES # BLD AUTO: 0.53 K/UL — LOW (ref 1.2–3.4)
LYMPHOCYTES # BLD AUTO: 4.2 % — LOW (ref 20.5–51.1)
MCHC RBC-ENTMCNC: 28.4 PG — SIGNIFICANT CHANGE UP (ref 27–31)
MCHC RBC-ENTMCNC: 32.3 G/DL — SIGNIFICANT CHANGE UP (ref 32–37)
MCV RBC AUTO: 87.9 FL — SIGNIFICANT CHANGE UP (ref 80–94)
MONOCYTES # BLD AUTO: 0.28 K/UL — SIGNIFICANT CHANGE UP (ref 0.1–0.6)
MONOCYTES NFR BLD AUTO: 2.2 % — SIGNIFICANT CHANGE UP (ref 1.7–9.3)
NEUTROPHILS # BLD AUTO: 11.71 K/UL — HIGH (ref 1.4–6.5)
NEUTROPHILS NFR BLD AUTO: 93.3 % — HIGH (ref 42.2–75.2)
NRBC # BLD: 0 /100 WBCS — SIGNIFICANT CHANGE UP (ref 0–0)
PLATELET # BLD AUTO: 329 K/UL — SIGNIFICANT CHANGE UP (ref 130–400)
POTASSIUM SERPL-MCNC: 5.2 MMOL/L — HIGH (ref 3.5–5)
POTASSIUM SERPL-SCNC: 5.2 MMOL/L — HIGH (ref 3.5–5)
PROT SERPL-MCNC: 7.2 G/DL — SIGNIFICANT CHANGE UP (ref 6–8)
PROTHROM AB SERPL-ACNC: 12.3 SEC — SIGNIFICANT CHANGE UP (ref 9.95–12.87)
RBC # BLD: 4.3 M/UL — LOW (ref 4.7–6.1)
RBC # FLD: 14.5 % — SIGNIFICANT CHANGE UP (ref 11.5–14.5)
SARS-COV-2 RNA SPEC QL NAA+PROBE: SIGNIFICANT CHANGE UP
SODIUM SERPL-SCNC: 136 MMOL/L — SIGNIFICANT CHANGE UP (ref 135–146)
TROPONIN T SERPL-MCNC: <0.01 NG/ML — SIGNIFICANT CHANGE UP
WBC # BLD: 12.56 K/UL — HIGH (ref 4.8–10.8)
WBC # FLD AUTO: 12.56 K/UL — HIGH (ref 4.8–10.8)

## 2022-01-26 PROCEDURE — 70498 CT ANGIOGRAPHY NECK: CPT | Mod: 26,MA

## 2022-01-26 PROCEDURE — 99285 EMERGENCY DEPT VISIT HI MDM: CPT

## 2022-01-26 PROCEDURE — 63267 EXCISE INTRSPINL LESION LMBR: CPT | Mod: AS

## 2022-01-26 PROCEDURE — 69990 MICROSURGERY ADD-ON: CPT | Mod: AS

## 2022-01-26 PROCEDURE — 88304 TISSUE EXAM BY PATHOLOGIST: CPT | Mod: 26

## 2022-01-26 PROCEDURE — 93010 ELECTROCARDIOGRAM REPORT: CPT

## 2022-01-26 PROCEDURE — 70496 CT ANGIOGRAPHY HEAD: CPT | Mod: 26,MA

## 2022-01-26 PROCEDURE — 0042T: CPT

## 2022-01-26 PROCEDURE — 70450 CT HEAD/BRAIN W/O DYE: CPT | Mod: 26,MA,59

## 2022-01-26 PROCEDURE — 69990 MICROSURGERY ADD-ON: CPT

## 2022-01-26 PROCEDURE — 71045 X-RAY EXAM CHEST 1 VIEW: CPT | Mod: 26

## 2022-01-26 PROCEDURE — 63267 EXCISE INTRSPINL LESION LMBR: CPT

## 2022-01-26 RX ORDER — TAMSULOSIN HYDROCHLORIDE 0.4 MG/1
1 CAPSULE ORAL
Qty: 0 | Refills: 0 | DISCHARGE

## 2022-01-26 RX ORDER — ZOLPIDEM TARTRATE 10 MG/1
1 TABLET ORAL
Qty: 0 | Refills: 0 | DISCHARGE

## 2022-01-26 RX ORDER — ONDANSETRON 8 MG/1
4 TABLET, FILM COATED ORAL ONCE
Refills: 0 | Status: DISCONTINUED | OUTPATIENT
Start: 2022-01-26 | End: 2022-01-26

## 2022-01-26 RX ORDER — ANASTROZOLE 1 MG/1
0.5 TABLET ORAL
Qty: 0 | Refills: 0 | DISCHARGE

## 2022-01-26 RX ORDER — METHOCARBAMOL 500 MG/1
1 TABLET, FILM COATED ORAL
Qty: 30 | Refills: 0
Start: 2022-01-26

## 2022-01-26 RX ORDER — CEPHALEXIN 500 MG
1 CAPSULE ORAL
Qty: 20 | Refills: 0
Start: 2022-01-26 | End: 2022-01-30

## 2022-01-26 RX ORDER — SODIUM CHLORIDE 9 MG/ML
1000 INJECTION, SOLUTION INTRAVENOUS
Refills: 0 | Status: DISCONTINUED | OUTPATIENT
Start: 2022-01-26 | End: 2022-01-26

## 2022-01-26 RX ORDER — DEXTROAMPHETAMINE SACCHARATE, AMPHETAMINE ASPARTATE, DEXTROAMPHETAMINE SULFATE AND AMPHETAMINE SULFATE 1.875; 1.875; 1.875; 1.875 MG/1; MG/1; MG/1; MG/1
0 TABLET ORAL
Qty: 0 | Refills: 0 | DISCHARGE

## 2022-01-26 RX ORDER — SODIUM CHLORIDE 9 MG/ML
1000 INJECTION, SOLUTION INTRAVENOUS ONCE
Refills: 0 | Status: COMPLETED | OUTPATIENT
Start: 2022-01-26 | End: 2022-01-26

## 2022-01-26 RX ORDER — HYDROMORPHONE HYDROCHLORIDE 2 MG/ML
0.5 INJECTION INTRAMUSCULAR; INTRAVENOUS; SUBCUTANEOUS
Refills: 0 | Status: DISCONTINUED | OUTPATIENT
Start: 2022-01-26 | End: 2022-01-26

## 2022-01-26 RX ORDER — AMLODIPINE BESYLATE 2.5 MG/1
1 TABLET ORAL
Qty: 0 | Refills: 0 | DISCHARGE

## 2022-01-26 RX ADMIN — SODIUM CHLORIDE 1000 MILLILITER(S): 9 INJECTION, SOLUTION INTRAVENOUS at 20:32

## 2022-01-26 NOTE — ED PROVIDER NOTE - CLINICAL SUMMARY MEDICAL DECISION MAKING FREE TEXT BOX
Pt called as stroke code after interview and exam. Case d/w tele stroke neurologist, Dr Escobar.  Pt seen on tele stroke.  Not a tpa candidate.  Results reviewed and d/w pt and family member.  Rec admission for neuro checks and further eval. Pt does not want to stay in the hospital.  He is AAO x 3 and understands risks of leaving. Will AMA.

## 2022-01-26 NOTE — ED PROVIDER NOTE - NSICDXFAMILYHX_GEN_ALL_CORE_FT
Face Mask
FAMILY HISTORY:  Father  Still living? Unknown  FH: myocardial infarction, Age at diagnosis: Age Unknown

## 2022-01-26 NOTE — ASU DISCHARGE PLAN (ADULT/PEDIATRIC) - CARE PROVIDER_API CALL
Dilan Estevez)  Neurosurgery  [address] [place] [address]  Phone: [phone]  Fax: [phone]  Follow Up Time:

## 2022-01-26 NOTE — ED PROVIDER NOTE - ATTENDING CONTRIBUTION TO CARE
62 yo M PMHx HTN, high cholesterol, s/p L5/S1 laminectomy today presents with left arm paresthesias that started today around 4:30 pm. Pt had surgery today to lower back, left hospital around 2;30 pm and ate, Pt state that when he experienced tingling  to arm he started to feel pressure to his chest as well, no SOB.  On exam pt in NAD AAO x 3, speech is clear and fluent, face symmetric, PERRL, EOMI, good tone, equal strength, decreased sensation to left arm and left cheek. Lungs cta b/l no wrr, abd soft nt nd, no drift, good finger to nose.

## 2022-01-26 NOTE — ED ADULT TRIAGE NOTE - CHIEF COMPLAINT QUOTE
Pt states he had outpatient surgery today and was discharged. Approx 1 hr ago developed numbness in L arm and then heaviness in L side of chest. Dr Majano aware

## 2022-01-26 NOTE — ED PROVIDER NOTE - PHYSICAL EXAMINATION
CONST: Well appearing in NAD  EYES: Sclera and conjunctiva clear.  NECK: Non-tender, no meningeal signs, supple  CARD: Normal S1 S2; Normal rate and rhythm  RESP: Equal BS B/L, No wheezes, rhonchi or rales. No distress  GI: Soft, non-tender, non-distended.  MS: Normal ROM in all extremities. No edema of lower extremities, no calf pain, radial pulses 2+ bilaterally  SKIN: Warm, dry, no acute rashes. Good turgor  NEURO: A&Ox3, decreased sensation L face and LUE, no facial asymmetry, no pronator drift, normal finger to nose, no nystagmus, gross sensation intact, UE/LE strength 5/5,

## 2022-01-26 NOTE — ASU DISCHARGE PLAN (ADULT/PEDIATRIC) - NS MD DC FALL RISK RISK
For information on Fall & Injury Prevention, visit: [email or web address] OR  [email or web address] OR  https://www.cdc.gov/steadi/patient.html

## 2022-01-26 NOTE — ED PROVIDER NOTE - PATIENT PORTAL LINK FT
You can access the FollowMyHealth Patient Portal offered by [facility] by registering at the following website: [email or web address]. By joining [employer]’s FollowMyHealth portal, you will also be able to view your health information using other applications (apps) compatible with our system.

## 2022-01-26 NOTE — ED PROVIDER NOTE - NS ED ROS FT
Constitutional: See HPI.  Eyes: No visual changes, eye pain or discharge.   ENMT: No hearing changes, pain, discharge or infections. No neck pain or stiffness. No limited ROM  Cardiac: +resolved chest pain. No SOB or edema.  Respiratory: No cough or respiratory distress. No hemoptysis  GI: No nausea, vomiting, diarrhea or abdominal pain.  : No dysuria, frequency or burning. No Discharge  MS: No myalgia, muscle weakness, joint pain or back pain.  Neuro: + paresthesias. No headache or weakness. No LOC.  Skin: No skin rash.  Except as documented in the HPI, all other systems are negative.

## 2022-01-26 NOTE — ED PROVIDER NOTE - CARE PROVIDER_API CALL
Terrence Monroe)  Neuromuscular Medicine  [address] [place] [address], [address]  [place], [place] [address]  Phone: [phone]  Fax: [phone]  Follow Up Time:

## 2022-01-26 NOTE — CHART NOTE - NSCHARTNOTEFT_GEN_A_CORE
PACU ANESTHESIA ADMISSION NOTE      Procedure: Excision, synovial cyst, spine, lumbar      Post op diagnosis:  Synovial cyst of lumbar facet joint        ____  Intubated  TV:______       Rate: ______      FiO2: ______    __X__  Patent Airway    __X__  Full return of protective reflexes    __X__  Full recovery from anesthesia / back to baseline status    Vitals:  T(F): 98.0  HR: 76  BP: 108/62  RR: 12  SpO2: 95%     Mental Status:  __X__ Awake   ___X__ Alert   _____ Drowsy   _____ Sedated    Nausea/Vomiting:  ____ Yes, See Post - Op Orders      __X__ No    Pain Scale (0-10):  _____    Treatment: ____ None    __X__ See Post - Op/PCA Orders    Post - Operative Fluids:   __X__ Oral   ____ See Post - Op Orders    Plan: Discharge:   __X__Home       _____Floor     _____Critical Care    _____  Other:_________________    Comments:

## 2022-01-26 NOTE — ED PROVIDER NOTE - OBJECTIVE STATEMENT
61 y.o male w/ hx of HTN, HLD presents to the ED for evaluation.  states 2 hrs prior to arrival developed decreased sensation L arm and L face.  Also experience mild L sided chest pain, non exertional, nonpleuritic, no radiation of pain.  pain resolved upon arrival to ED.  states he felt anxious until he presented to ED.  Had L5-S1 laminectomy today at [facility].

## 2022-02-03 LAB — SURGICAL PATHOLOGY STUDY: SIGNIFICANT CHANGE UP

## 2022-02-04 DIAGNOSIS — E78.00 PURE HYPERCHOLESTEROLEMIA, UNSPECIFIED: ICD-10-CM

## 2022-02-04 DIAGNOSIS — M71.38 OTHER BURSAL CYST, OTHER SITE: ICD-10-CM

## 2022-02-04 DIAGNOSIS — I10 ESSENTIAL (PRIMARY) HYPERTENSION: ICD-10-CM

## 2022-02-10 ENCOUNTER — APPOINTMENT (OUTPATIENT)
Dept: NEUROSURGERY | Facility: CLINIC | Age: 62
End: 2022-02-10
Payer: COMMERCIAL

## 2022-02-10 PROCEDURE — 99024 POSTOP FOLLOW-UP VISIT: CPT

## 2022-02-14 ENCOUNTER — RX RENEWAL (OUTPATIENT)
Age: 62
End: 2022-02-14

## 2022-03-23 ENCOUNTER — RX RENEWAL (OUTPATIENT)
Age: 62
End: 2022-03-23

## 2022-04-04 ENCOUNTER — APPOINTMENT (OUTPATIENT)
Dept: UROLOGY | Facility: CLINIC | Age: 62
End: 2022-04-04
Payer: COMMERCIAL

## 2022-04-04 VITALS
HEIGHT: 68 IN | SYSTOLIC BLOOD PRESSURE: 117 MMHG | DIASTOLIC BLOOD PRESSURE: 82 MMHG | WEIGHT: 202 LBS | BODY MASS INDEX: 30.62 KG/M2

## 2022-04-04 PROCEDURE — 99214 OFFICE O/P EST MOD 30 MIN: CPT

## 2022-04-04 RX ORDER — METHOCARBAMOL 750 MG/1
750 TABLET, FILM COATED ORAL
Qty: 30 | Refills: 0 | Status: ACTIVE | COMMUNITY
Start: 2022-01-26

## 2022-04-04 RX ORDER — CEPHALEXIN 500 MG/1
500 CAPSULE ORAL
Qty: 20 | Refills: 0 | Status: COMPLETED | COMMUNITY
Start: 2022-01-26

## 2022-04-04 RX ORDER — GABAPENTIN 300 MG/1
300 CAPSULE ORAL
Qty: 90 | Refills: 0 | Status: ACTIVE | COMMUNITY
Start: 2022-01-11 | End: 1900-01-01

## 2022-04-04 RX ORDER — PRAVASTATIN SODIUM 10 MG/1
10 TABLET ORAL
Qty: 30 | Refills: 0 | Status: ACTIVE | COMMUNITY
Start: 2022-03-14

## 2022-04-04 RX ORDER — AMLODIPINE BESYLATE 5 MG/1
5 TABLET ORAL
Qty: 30 | Refills: 0 | Status: ACTIVE | COMMUNITY
Start: 2022-03-14

## 2022-04-04 RX ORDER — OXYCODONE AND ACETAMINOPHEN 5; 325 MG/1; MG/1
5-325 TABLET ORAL
Qty: 30 | Refills: 0 | Status: COMPLETED | COMMUNITY
Start: 2022-01-26

## 2022-04-04 RX ORDER — MUPIROCIN 20 MG/G
2 OINTMENT TOPICAL
Qty: 22 | Refills: 0 | Status: ACTIVE | COMMUNITY
Start: 2022-01-12

## 2022-04-04 RX ORDER — DOXYCYCLINE HYCLATE 100 MG/1
100 TABLET ORAL
Qty: 14 | Refills: 0 | Status: COMPLETED | COMMUNITY
Start: 2022-01-12

## 2022-04-04 NOTE — LETTER BODY
[Dear  ___] : Dear  [unfilled], [Courtesy Letter:] : I had the pleasure of seeing your patient, [unfilled], in my office today. [Please see my note below.] : Please see my note below. [Sincerely,] : Sincerely, [FreeTextEntry2] : Alonso Rosales MD\par [address] [facility] [address]\[place] [address]\par  [DrTavia  ___] : Dr. SALES

## 2022-04-04 NOTE — HISTORY OF PRESENT ILLNESS
[Nocturia] : nocturia [FreeTextEntry1] : Jordan is a 61-year-old male born September 14, 1960 I last saw January 6, 2022.  We are following him for voiding dysfunction which is well controlled with tamsulosin.  He wakes up.  After about 5 or 6 hours but he wakes up because he does not sleep more than that.  When he goes to void he said is with good control with a good stream he feels empty and his urination does not bother him.  Its not perfect but is more than good enough he would not want to do anything more than continue the Flomax.\par \par With respect to his ED he had an implant put in by Dr. Wakefield in 2021 it is working very well he has no complaints.\par \par With respect to his TRT he is using 0.6 ml 1/ week ( we write for 1 ml q 7 so he gets a 10 ml bottle) and is doing well.  He feels good energy he has no libido along with 0.5 mg of Arimidex twice in 5 days. \par He had bloods drawn on March 25, 2022 it was at 1 PM but it does not make that much of a difference as he no longer has a circadian rhythm.  Trough levels were drawn on 3/23/2022\par Total free and bioavailable testosterone were excellent at 860, 194 and 373, up from 515, 97 and 195\par Sex hormone binding globulin was low at 21\par Estradiol was 23 up from 21\par Liver function tests were normal\par Hemoglobin was 13.4 with a platelet count of 355,000\par \par

## 2022-04-04 NOTE — LETTER HEADER
[FreeTextEntry3] : Keyla Garza M.D.\par Director Emeritus of Urology\par [facility]\par [address] [place] [address], [address]\[place] [address]

## 2022-04-04 NOTE — ASSESSMENT
[FreeTextEntry1] : His labs are excellent he is well controlled he is feeling good and really from when we met him pretty much everything is fixed he is able to have sex, he has energy, a normal Libido, and he can urinate normally.  I would not change anything\par \par I will renew the tamsulosin, the testosterone writing for 1 mL every 7 but he will take 0.6 mL every 7 days, he will take the Arimidex half a tablet twice in 5 days we will get blood in 3-1/2 and see me in 4 months

## 2022-07-05 ENCOUNTER — APPOINTMENT (OUTPATIENT)
Dept: PAIN MANAGEMENT | Facility: CLINIC | Age: 62
End: 2022-07-05

## 2022-07-08 ENCOUNTER — APPOINTMENT (OUTPATIENT)
Dept: PAIN MANAGEMENT | Facility: CLINIC | Age: 62
End: 2022-07-08
Payer: COMMERCIAL

## 2022-07-08 ENCOUNTER — APPOINTMENT (OUTPATIENT)
Dept: PAIN MANAGEMENT | Facility: CLINIC | Age: 62
End: 2022-07-08

## 2022-07-08 PROCEDURE — 00630 ANES PX LUMBAR REGION NOS: CPT | Mod: QZ,P2

## 2022-07-08 PROCEDURE — 64484 NJX AA&/STRD TFRM EPI L/S EA: CPT | Mod: LT

## 2022-07-08 PROCEDURE — 93770 DETERMINATION VENOUS PRESS: CPT

## 2022-07-08 PROCEDURE — 77002 NEEDLE LOCALIZATION BY XRAY: CPT | Mod: 79

## 2022-07-08 PROCEDURE — 72100 X-RAY EXAM L-S SPINE 2/3 VWS: CPT

## 2022-07-08 PROCEDURE — 94761 N-INVAS EAR/PLS OXIMETRY MLT: CPT

## 2022-07-08 PROCEDURE — 93040 RHYTHM ECG WITH REPORT: CPT | Mod: 79

## 2022-07-08 PROCEDURE — 64483 NJX AA&/STRD TFRM EPI L/S 1: CPT | Mod: LT

## 2022-07-08 NOTE — PROCEDURE
[FreeTextEntry1] : BILATERAL L4-S1 MBB WITH MAC  [FreeTextEntry3] : LUMBAR MEDIAL BRANCH INJECTION UNDER FLUOROSCOPY\par \par  Date:  07/08/2022\par \par Patient: Jordan Phan\par \par Preoperative Diagnosis: Bilateral Lumbar spondylosis; facet arthropathy L4 – S1\par \par Postoperative Diagnosis: Same\par \par Procedure: Diagnostic Lumbar median branch nerve injection, L4 – S1 under fluoroscopy\par \par Physician: Mariama Clifford MD, FAAPMR\par \par \par Anesthesiologist/CRNA: MsTavia Rush\par \par Anesthesia: See Nurses note. MAC/Lidocaine/Cold spray\par \par \par \par Medical Necessity:  Failure of conservative management.\par \par Indication for Fluoroscopy:  This procedure requires the precise placement of the spinal needle.  It is the only way to accurately and safely perform the injection.\par \par CONSENT: The possible complications including infection, bleeding, nerve damage, Hospital admission, death or failure of the procedure; though unusual, are theoretically possible. The patient was educated about the of the procedure and alternative therapies. All questions were answered and the patient freely gave consent to proceed.\par \par Monitoring:  Patient had continuous blood pressure, EKG, and pulse oximetry throughout the case. See nurse's notes.\par \par Procedure Note: \par \par After obtaining written consent, the patient was placed on the fluoroscopic table in the prone position. A betadine prep was performed and the area surrounded by sterile drapes. A time out was performed. Fluoroscopy unit was positioned over the patient and images of the spine (AP and oblique views) obtained.  The entry sites for the above left and right L4-S1 levels median branch were determined and cold spray was used to localize the area. At each level a 22guage 3 ½ inch spinal needle was placed at the level of the median branch to the facet under fluoroscopic guidance.  A dose of Lidocaine 2% 1cc was administered at each level. The needles at each level were withdrawn following administration of the medication intact. There were no signs of, intravascular block or hypotension. The needle was removed intact. A band aid was place on the site.\par \par  \par \par Complications: None. The patient tolerated the procedure well. \par \par  \par \par Disposition: I have examined the patient and there are no new physical findings since the original presentation.  Sensory and motor function were intact. The patient met discharge criteria see nurses notes. The discharge instruction sheet was reviewed and given to the patient. The patient was discharged home with a .\par \par  \par \par Comment: Patient was unable to tell. If 70% relief greater than 2 hours or 50% greater than 24 hours would proceed to RFA. If 50% less than 24 hours would repeat to confirm for RFA. Follow in office. Call if any problems.\par \par  \par \par Indication for RFA: The pt had a positive response to medial branch diagnostic injections and is considered a good candidate for the thermal RF ablation procedure. The diagnostic injection provided at least 50% reduction in pain for the duration of the local anesthetic.\par \par The following criteria have been met: 1) failed response to at least 3 months of conservative management; 2) patient has LBP that is non-radicular, suggesting facet joint origin supported by absence of nerve root compression documented on the medical record on H&P and radiographic evaluation; 3) minimum of 6 months has elapsed since any prior RF treatments. If prior ablation therapy has been performed, it provided at least 50% relief for minimum of 10-12 weeks; 4) no prior spinal fusion at the vertebral level being treated;\par \par \par \par  \par \par This document was electronically signed by: \par \par Mariama Clifford MD, FAAPMR\par Diplomate, American Board of Physical Medicine and Rehabilitation\par Diplomate, American Board of Pain Medicine\par \par

## 2022-07-20 ENCOUNTER — APPOINTMENT (OUTPATIENT)
Dept: PAIN MANAGEMENT | Facility: CLINIC | Age: 62
End: 2022-07-20

## 2022-07-20 DIAGNOSIS — M54.50 LOW BACK PAIN, UNSPECIFIED: ICD-10-CM

## 2022-08-01 ENCOUNTER — APPOINTMENT (OUTPATIENT)
Dept: UROLOGY | Facility: CLINIC | Age: 62
End: 2022-08-01

## 2022-08-22 ENCOUNTER — APPOINTMENT (OUTPATIENT)
Dept: UROLOGY | Facility: CLINIC | Age: 62
End: 2022-08-22

## 2022-09-21 ENCOUNTER — RX RENEWAL (OUTPATIENT)
Age: 62
End: 2022-09-21

## 2022-09-23 ENCOUNTER — APPOINTMENT (OUTPATIENT)
Dept: PAIN MANAGEMENT | Facility: CLINIC | Age: 62
End: 2022-09-23

## 2022-09-23 VITALS
HEART RATE: 81 BPM | WEIGHT: 202 LBS | DIASTOLIC BLOOD PRESSURE: 98 MMHG | SYSTOLIC BLOOD PRESSURE: 167 MMHG | HEIGHT: 68 IN | BODY MASS INDEX: 30.62 KG/M2

## 2022-09-23 DIAGNOSIS — M46.97 UNSPECIFIED INFLAMMATORY SPONDYLOPATHY, LUMBOSACRAL REGION: ICD-10-CM

## 2022-09-23 DIAGNOSIS — M54.50 LOW BACK PAIN, UNSPECIFIED: ICD-10-CM

## 2022-09-23 PROCEDURE — 99213 OFFICE O/P EST LOW 20 MIN: CPT

## 2022-09-23 RX ORDER — GABAPENTIN 600 MG/1
600 TABLET, COATED ORAL DAILY
Qty: 30 | Refills: 2 | Status: ACTIVE | COMMUNITY
Start: 2022-09-23 | End: 1900-01-01

## 2022-09-23 NOTE — HISTORY OF PRESENT ILLNESS
[FreeTextEntry1] : ORIGINAL PRESENTATION: Ms. Phan is a 62 year old man referred by our neurosurgery department complaining of back pain. Pain is located in the left lower back and radiates into the leg to the foot with associated numbness to the toes. He denies a prior history of back pain. He states the pain started 4 months ago when he twisted his back. He had pain for 1 day then it subsided. 1 week ago, the pain returned after doing some work on a car. The following day, he had left lower back pain which radiated into the buttock. He states with each passing day, the pain radiated further and further down the leg to the foot. He notes extreme difficulty sleeping at night due to pain and discomfort. He has trialed Tramadol 50 mg, Percocet 5/325 mg, and Gabapentin all with incomplete relief. He states lately he has been "eating Ibuprofen" with no relief. He has not started any conservative treatment and states Dr. Estevez advised it would not be beneficial. The pain started after no inciting event. The patient has had this pain for 2 months. Patient describes pain as severe. During the last month the pain has been nearly constant with symptoms worsening in no typical pattern. Pain described as burning, sharp, shooting. Pain is associated with numbness/pins and needles into the left lower extremity. Patient has weakness in the left lower extremity, with falling. Pain is increased with standing, sitting, walking, exercise. Pain is decreased with lying down, exercise. Pain is not changed with coughing/sneezing, bowel movement. Bowel or bladder habits have not changed.\par \par ACTIVITIES: Patient could walk less than a block before the pain starts. The patient often lies down because of pain. Patient uses no assistive device at this time. Patient has difficulty going to work, performing household chores, doing outside work or shopping, socializing with friends, participating in recreational activities and exercising at this time.\par \par PRIOR PAIN TREATMENTS: None mentioned.\par \par PRIOR PAIN MEDICATIONS: Oxycodone, tramadol.\par \par PATIENT PRESENTS FOR FOLLOW UP: Pateint is S/P BILATERAL L4-S1 MBB w/ MAC which provided him 95% relief for about 2 days with the pain slowly starting to come back. He is satisfied with his relief at this time.  The next step is to move forward with a B L4-S1 RFA w/ MAC. Since he was provided with great relief from the MBB he is interested in proceeding with the RFA at this time.

## 2022-09-23 NOTE — PHYSICAL EXAM
[Normal Coordination] : normal coordination [Normal DTR UE/LE] : normal DTR UE/LE  [Normal Sensation] : normal sensation [Normal Mood and Affect] : normal mood and affect [Orientated] : orientated [Normal Skin] : normal skin [No Rash] : no rash [No Ulcers] : no ulcers [No Lesions] : no lesions [No obvious lymphadenopathy in areas examined] : no obvious lymphadenopathy in areas examined [Flexion] : flexion [Extension] : extension [] : patient ambulates without assistive device

## 2022-09-23 NOTE — DATA REVIEWED
[FreeTextEntry1] : MRI lumbar spine dated 09/22/2021: Lower lumbar degenerative disc change/disc protrusions. Mid, lower lumbardegenerative facet disease. Juxta-articular cyst medial to the left L5-S1 facet joint with left thecal sac effacement, left S1 nerve root involvement. Central right L5-S1 disc protrusion which at least abuts the right S1 nerve root. Right L5-S1 foraminal disc extension which abuts/almost abuts the right L5 nerve root. \par \par SOAPP: low 10/19/21\par Low risk: Patient has combination of a low risk SOAP and no risk factors. UDS would be repeated randomly every quarter.\par \par UDS: No data obtained today\par \par [place] REGISTRY: Checked\par

## 2022-09-23 NOTE — ASSESSMENT
[FreeTextEntry1] : This is a 62 year old male with left lower back and radiates into the leg to the foot with associated numbness to the toes. SInce he was provided with more than 90% relief from the B L4-S1 MBB we will proceed with a  B L4-S1 RFA w/ MAC. \par \par \par All this patients questions were answered and the conversation was understood well.\par \par Patient with predominantly axial lumbar pain unresponsive with conservative care.  There is pain reproduction with positive Kemps maneuver (pain provocation with extention and rotation) on physical exam.  Patient status post bilateral L4-S1 lumbar facet injection with improvement in ADLs/Ambulation and decrease in pain levels.  Will proceed with bilateral L4-S1 lumbar radio-frequency ablation.\par \par ANESTHESIA PARAGRAPH: The patient has severe anxiety of procedures that necessitates monitored anesthesia care (MAC). The procedure performed will be close to major nerves, arteries, and spinal cord and/or joint structures. Due to the proximity of these structures, we need the patient to be still during the procedure. With the help of MAC, this will be safely achieved and decrease the risk of any complications.\par \par RISK AND BENEFIT PARAGRAPH: Risk, benefits, pros and cons of procedure were explained to the patient using models and diagrams and their questions were answered.\par \par \par I, Halina Singletary, attest that this documentation has been prepared under the direction and in the presence of Provider Mariama Clifford MD.\par \par \par Thank you for allowing me to assist in the management of this patient. \par \par \par Best Regards, \par \par \par Mariama Clifford M.D., [facility]\par \par \par Diplomate, American Board of Physical Medicine and Rehabilitation\par Diplomate, American Board of Pain Medicine \par Diplomate, American Board of Pain Management\par

## 2022-10-11 ENCOUNTER — APPOINTMENT (OUTPATIENT)
Dept: PAIN MANAGEMENT | Facility: CLINIC | Age: 62
End: 2022-10-11

## 2022-10-11 PROCEDURE — 94761 N-INVAS EAR/PLS OXIMETRY MLT: CPT

## 2022-10-11 PROCEDURE — 93770 DETERMINATION VENOUS PRESS: CPT

## 2022-10-11 PROCEDURE — 93040 RHYTHM ECG WITH REPORT: CPT | Mod: 79

## 2022-10-11 PROCEDURE — 64636Z: CUSTOM | Mod: RT

## 2022-10-11 PROCEDURE — 00630 ANES PX LUMBAR REGION NOS: CPT | Mod: QZ,P2

## 2022-10-11 PROCEDURE — 64635 DESTROY LUMB/SAC FACET JNT: CPT | Mod: RT

## 2022-10-11 NOTE — PROCEDURE
[FreeTextEntry1] : Medial Branch Nerve Rhizotomy- Lumbar UNDER FLUORSCOPY [FreeTextEntry3] : Date:  10/11/2022\par \par Patient: Jordan Phan\par \par :  1960\par \par \par \par \par \par Preoperative Diagnosis: Lumbar facet arthropathy L4- S1\par \par Postoperative Diagnosis: Same\par \par Procedure: Neurotomy of the medial branch nerve of Bilateral L4-S1 under fluoroscopy.\par \par Physician: Mariama Clifford MD, FAAPMR\par \par \par \par Anesthesiologist/CRNA: Dr. Cedeño, MsTavia Woodson\par \par Anesthesia: See Nurses note. MAC/Lidocaine/Cold spray, Versed 4mg, Fentanyl 150mcg\par \par \par \par Medical Necessity: Failure of conservative medical management. Facet arthropathy; intractable axial pain amenable only to medial branch nerve injections. Criteria met for a medial branch nerve neurotomy.\par \par \par \par Indication for Fluoroscopy:  This procedure requires the precise placement of the spinal needle into the epidural space.  It is the only way to accurately and safely perform the injection.\par \par \par \par CONSENT: The possible complications including infection, bleeding, nerve damage, hospital admission, death or failure of the procedure; though unusual, are theoretically possible. The patient was educated about the of the procedure and alternative therapies. All questions were answered and the patient freely gave consent to proceed. The patient was also told that sometimes patients will notice lower extremity weakness immediately following the procedure due to extravasation of local anesthetic solution onto the main nerve root during the procedure. In addition, the patient was informed that 1 to 2 weeks of perioperative discomfort following the procedure is to be expected.\par \par \par \par Monitoring:  Patient had continuous blood pressure, EKG, and pulse oximetry throughout the case. See nurse's notes.\par \par  \par \par PROCEDURE NOTE:\par \par After obtaining written consent, the patient was placed in a prone position on a fluoroscopy table.  The back was prepped and draped in a sterile fashion and a C-arm fluoroscopic device was used for localization of the radiofrequency target sites. A time out was performed. The  mm disposable Carrington*-coated cannula with a 5 mm active tip was adjusted via the sizing collar so that the electrode fit just inside the inner bevel at the end of the bare metal. Prior to beginning the procedure, the internal test mode function of the radiofrequency unit was checked to make sure the machine was functioning properly. The initial target zones included the junction of the transverse process and the pedicle at each level.\par \par  \par \par At each target site, Cold spray was used to localize the area followed by 2% Lidocaine solution was used to anesthetize the skin and subcutaneous tissues and the radiofrequency cannula were placed in a parallel fashion to the x-ray beam and directed in a bulls-eye fashion down to the target zones. The cannula was then walked over the leading edge and advanced approximately 2 to 3 mm in an anterior direction. If patient gets sustained relief will have patient do modified planks 3 times a day on carpet or yoga mat starting at 5 seconds building up to 1 minute without grimacing/Valsalva and walking.\par \par  \par \par The second target zones included the superior and medial junction of the sacral ala. At each target site, a 2% Lidocaine solution was used to anesthetize the skin and subcutaneous tissues. Again, the radiofrequency cannulas were placed in a parallel fashion to the x-ray beam and directed in a bulls-eye fashion down to the target zones. The cannula was then walked over the leading edge and advanced approximately 2 to 3 mm in an anterior direction. A lateral view of the spine was performed to insure that the cannulas were not too far to the opening of the foraminal canals. Sensory stimulation at 50 hertz was performed at each target area. Referral of the sensory stimulation was noted at less than 1 volt over the paravertebral area or hip. Motor dissociation at 2 hertz was obtained by stimulating up to 3 times the voltage of the sensory stimulation and insuring a lack of motor movement in the lower extremities. Once the radiofrequency cannula was in correct position, a 2% Lidocaine with 10mg of depomedrol solution was used to rapidly anesthetize the lesion site. After a thirty second delay, thermal lesions were then created at each level for 90 seconds at a temperature of 80° C. After the lesions were created, the cannulas were removed intact and sterile bandages placed at the puncture sites.. \par \par  \par \par Complications: None. The patient tolerated the procedure well. \par \par  \par \par Disposition: I have examined the patient and there are no new physical findings since the original presentation.  Sensory and motor function were intact. The patient met discharge criteria see nurses notes. The discharge instruction sheet was reviewed and given to the patient. The patient was discharged home with a . If patient gets sustained relief will have patient do modified planks 3 times a day on carpet or yoga mat starting at 5 seconds building up to 1 minute without grimacing/Valsalva and walking.\par \par  \par \par Comment: RFA today, follow up in 2-4 weeks. Call if in problem.\par \par  \par \par Indication for RFA: The pt had a positive response to medial branch diagnostic injections and is considered a good candidate for the thermal RF ablation procedure. The diagnostic injection provided at least 50% reduction in pain for the duration of the local anesthetic.  \par The following criteria have been met: 1) failed response to at least 3 months of conservative management; 2) patient has LBP that is non-radicular, suggesting facet joint origin supported by absence of nerve root compression documented on the medical record on H&P and radiographic evaluation; 3) minimum of 6 months has elapsed since any prior RF treatments. If prior ablation therapy has been performed, it provided at least 50% relief for minimum of 10-12 weeks; 4) no prior spinal fusion at the vertebral level being treated.\par \par  \par \par This document was electronically signed by:\par \par Mariama Clifford MD, FAAPMR\par Diplomate, American Board of Physical Medicine and Rehabilitation\par Diplomate, American Board of Pain Medicine\par \par \par \par

## 2022-10-31 ENCOUNTER — APPOINTMENT (OUTPATIENT)
Dept: UROLOGY | Facility: CLINIC | Age: 62
End: 2022-10-31

## 2022-10-31 VITALS — RESPIRATION RATE: 16 BRPM | BODY MASS INDEX: 30.62 KG/M2 | WEIGHT: 202 LBS | TEMPERATURE: 98 F | HEIGHT: 68 IN

## 2022-10-31 DIAGNOSIS — N52.9 MALE ERECTILE DYSFUNCTION, UNSPECIFIED: ICD-10-CM

## 2022-10-31 PROCEDURE — 99214 OFFICE O/P EST MOD 30 MIN: CPT

## 2022-10-31 NOTE — ASSESSMENT
[FreeTextEntry1] : He is voiding well on tamsulosin elects to continue.\par \par Concerning his testosterone, he is doing well at his current dose and his labs are well within normal limits.  We will continue at his current dose as well as the Arimidex.  He will obtain blood work in 2 and half months and follow-up in 3 months for review\par \par

## 2022-10-31 NOTE — HISTORY OF PRESENT ILLNESS
[Nocturia] : nocturia [FreeTextEntry1] : Jordan is a 62-year-old male currently being followed for voiding dysfunction, well-controlled on tamsulosin, and low testosterone.\par \par He has history of erectile dysfunction status post IPP by Dr. Wakefield in 2021.  He is satisfied he denies any issues with this at this time.\par \par Currently he is managed on 0.6 ml of IM testosterone weekly with good efficacy without adverse events.  Additionally he is managed on 0.5 mg of Arimidex twice in 5 days.\par \par He resents today to review his trough/peak values\par \par  \par \par

## 2022-10-31 NOTE — LETTER BODY
[Dear  ___] : Dear  [unfilled], [Courtesy Letter:] : I had the pleasure of seeing your patient, [unfilled], in my office today. [Please see my note below.] : Please see my note below. [Sincerely,] : Sincerely, [DrTavia  ___] : Dr. SALES [FreeTextEntry2] : Alonso Rosales MD\par [address] [facility] [address]\[place] [address]\par

## 2022-10-31 NOTE — ADDENDUM
[FreeTextEntry1] : I was available by phone for A/V as I am home sick with COVID patient declined to wait while they got me on the phone, the issues were reviewed with the physician assistant and I agree with above

## 2022-10-31 NOTE — LETTER HEADER
[FreeTextEntry3] : Keyla Garza M.D.\par Director Emeritus of Urology\par [facility]\par [address] [place] [address], [address]\[place] [address]

## 2022-11-08 ENCOUNTER — APPOINTMENT (OUTPATIENT)
Dept: PAIN MANAGEMENT | Facility: CLINIC | Age: 62
End: 2022-11-08

## 2022-11-18 ENCOUNTER — APPOINTMENT (OUTPATIENT)
Dept: UROLOGY | Facility: CLINIC | Age: 62
End: 2022-11-18

## 2023-01-30 ENCOUNTER — APPOINTMENT (OUTPATIENT)
Dept: UROLOGY | Facility: CLINIC | Age: 63
End: 2023-01-30

## 2023-03-14 NOTE — HISTORY OF PRESENT ILLNESS
[FreeTextEntry1] : Jordan was seen September 26, he’s been having severe voiding symptoms, he’d been on testosterone where the levels that we saw showed him to be getting his next dose when his levels were where he should end up after he got the dose. We elected to send him for a renal and bladder ultrasound, he was can keep a record of his intake and output, get some urine testing, they were going to let him bottom out with respect to his hormones get a baseline testosterone and restart him on an average dose. He had the ultrasound done at [facility] on October 19 had labs drawn on October 3 with urine testing done on the same day. He kept the record of his intake and output about last Tuesday or Wednesday and after review to see how to go strongly he did a flow study after which we checked his post void. Given the volume on the post void we had him go to the bathroom and urinate in the privacy of the bathroom and immediately scanned him again. Please see the uroflow note for specifics on that below please see the results and discussion for the rest of the issues.
[Negative] : Heme/Lymph

## 2023-05-08 ENCOUNTER — RX RENEWAL (OUTPATIENT)
Age: 63
End: 2023-05-08

## 2023-05-10 ENCOUNTER — APPOINTMENT (OUTPATIENT)
Dept: UROLOGY | Facility: CLINIC | Age: 63
End: 2023-05-10
Payer: COMMERCIAL

## 2023-05-10 VITALS
BODY MASS INDEX: 28.79 KG/M2 | HEIGHT: 68 IN | WEIGHT: 190 LBS | OXYGEN SATURATION: 98 % | RESPIRATION RATE: 17 BRPM | TEMPERATURE: 98.3 F | SYSTOLIC BLOOD PRESSURE: 136 MMHG | HEART RATE: 115 BPM | DIASTOLIC BLOOD PRESSURE: 82 MMHG

## 2023-05-10 PROCEDURE — 99214 OFFICE O/P EST MOD 30 MIN: CPT

## 2023-05-10 RX ORDER — SYRINGE, DISPOSABLE, 1 ML
1 ML SYRINGE, EMPTY DISPOSABLE MISCELLANEOUS
Qty: 13 | Refills: 0 | Status: ACTIVE | OUTPATIENT
Start: 2019-10-30

## 2023-05-10 RX ORDER — NEEDLES, DISPOSABLE 25GX5/8"
22G X 1-1/2" NEEDLE, DISPOSABLE MISCELLANEOUS
Qty: 10 | Refills: 6 | Status: ACTIVE | COMMUNITY
Start: 2019-10-30 | End: 1900-01-01

## 2023-05-10 RX ORDER — SYRINGE W-NEEDLE,DISPOSAB,3 ML 22GX1"
22G X 1" SYRINGE, EMPTY DISPOSABLE MISCELLANEOUS
Qty: 20 | Refills: 0 | Status: ACTIVE | OUTPATIENT
Start: 2020-11-09

## 2023-05-10 NOTE — LETTER HEADER
[FreeTextEntry3] : Keyla Garza M.D.\par Director Emeritus of Urology\par [facility]\par [address] [place] [address], [address]\[place] [address]

## 2023-05-10 NOTE — LETTER BODY
[Dear  ___] : Dear  [unfilled], [Courtesy Letter:] : I had the pleasure of seeing your patient, [unfilled], in my office today. [Please see my note below.] : Please see my note below. [Sincerely,] : Sincerely, [DrTavia  ___] : Dr. SALES [FreeTextEntry2] : Alonso Rosales MD\par [address] [facility] [address]\[place] [address]\par

## 2023-05-10 NOTE — ASSESSMENT
[FreeTextEntry1] : His physical exam is within normal limits.  His blood work is acceptable which shows that he is not always compliant with the Arimidex.  He will continue injecting 0.6 mm every week.  He will get blood work in 3 to 4 weeks and follow-up in 6 weeks for review.  He will continue anastrozole 1 mg 3 times a week

## 2023-05-10 NOTE — HISTORY OF PRESENT ILLNESS
[Nocturia] : nocturia [FreeTextEntry1] : Jordan is a 62-year-old male currently being followed for voiding dysfunction, well-controlled on tamsulosin, and low testosterone. He has history of erectile dysfunction status post IPP by Dr. Wakefield in 2021. \par \par Currently he is managed on 0.6 ml of IM testosterone weekly with good efficacy without adverse events.  Additionally he is managed on 0.5 mg of Arimidex twice in 5 days for treatment of elevated estradiol.\par \par He presents today to review his trough/peak values from January.  He had to cancel that appointment and could not make another appointment until now.  Has been using testosterone from a friend and injecting the same amount\par \par He recently ran out of Arimidex and in retrospect tells us he was not always fully compliant with the\par \par

## 2023-07-12 ENCOUNTER — APPOINTMENT (OUTPATIENT)
Dept: UROLOGY | Facility: CLINIC | Age: 63
End: 2023-07-12
Payer: COMMERCIAL

## 2023-07-12 VITALS
SYSTOLIC BLOOD PRESSURE: 130 MMHG | HEIGHT: 68 IN | HEART RATE: 78 BPM | TEMPERATURE: 97.4 F | DIASTOLIC BLOOD PRESSURE: 84 MMHG | BODY MASS INDEX: 28.49 KG/M2 | OXYGEN SATURATION: 99 % | RESPIRATION RATE: 16 BRPM | WEIGHT: 188 LBS

## 2023-07-12 DIAGNOSIS — R39.13 SPLITTING OF URINARY STREAM: ICD-10-CM

## 2023-07-12 DIAGNOSIS — R35.1 NOCTURIA: ICD-10-CM

## 2023-07-12 DIAGNOSIS — R33.9 RETENTION OF URINE, UNSPECIFIED: ICD-10-CM

## 2023-07-12 PROCEDURE — 99214 OFFICE O/P EST MOD 30 MIN: CPT

## 2023-07-12 NOTE — PHYSICAL EXAM
[General Appearance - Well Developed] : well developed [General Appearance - Well Nourished] : well nourished [Normal Appearance] : normal appearance [Well Groomed] : well groomed [General Appearance - In No Acute Distress] : no acute distress [Abdomen Soft] : soft [Abdomen Tenderness] : non-tender [Costovertebral Angle Tenderness] : no ~M costovertebral angle tenderness [Penis Abnormality] : normal circumcised penis [Testes Tenderness] : no tenderness of the testes [Testes Mass (___cm)] : there were no testicular masses [Heart Rate And Rhythm] : Heart rate and rhythm were normal [Edema] : no peripheral edema [] : no respiratory distress [Respiration, Rhythm And Depth] : normal respiratory rhythm and effort [Exaggerated Use Of Accessory Muscles For Inspiration] : no accessory muscle use [Auscultation Breath Sounds / Voice Sounds] : lungs were clear to auscultation bilaterally [Oriented To Time, Place, And Person] : oriented to person, place, and time [Affect] : the affect was normal [Mood] : the mood was normal [Not Anxious] : not anxious [Normal Station and Gait] : the gait and station were normal for the patient's age [FreeTextEntry1] : Fields of View were normal

## 2023-07-12 NOTE — ASSESSMENT
[FreeTextEntry1] : His numbers are okay Labs are dropped high and we will have him drop to 0.55 instead of 0.6 he will stay on every 7 days.  We will get blood in 2-1/2 and see me in 3 months.\par \par As far as the Arimidex he will stay with 1 pill 3 times per week

## 2023-07-12 NOTE — LETTER BODY
[Dear  ___] : Dear  [unfilled], [Courtesy Letter:] : I had the pleasure of seeing your patient, [unfilled], in my office today. [Please see my note below.] : Please see my note below. [Sincerely,] : Sincerely, [FreeTextEntry2] : , MD\par [address] [place] [address]\[place] [address]\par  [DrTavia  ___] : Dr. SALES

## 2023-07-12 NOTE — LETTER HEADER
[FreeTextEntry3] : Keyla Garza M.D.\par Director Emeritus of Urology\par [facility]\par [address] [place] [address], [address]\[place] [address]

## 2023-07-12 NOTE — HISTORY OF PRESENT ILLNESS
[Nocturia] : nocturia [FreeTextEntry1] : Jordan is a 62-year-old male born September 14, 1960 last seen May 10, 2023.  He is status post an inflatable penile prosthesis by Dr. Wakefield in 2020 and says that is working fine\par \par We have him on testosterone cypionate is been injecting 0.6 mL every 7 days and is supposed to take the anastrozole 1 mg 3 times a week sometimes he forgets.  We wanted to get blood work in early June and see me in mid June to see if he is compliant if things go well.\par \par Blood test were done the end of June beginning of July for trough and peak and is here for review and exam and a discussion\par \par Please note he has voiding dysfunction completely controlled by tamsulosin as long as he remembers to take it.  If he stops taking it he stops urinating.

## 2023-08-07 ENCOUNTER — RX RENEWAL (OUTPATIENT)
Age: 63
End: 2023-08-07

## 2023-08-07 RX ORDER — ANASTROZOLE TABLETS 1 MG/1
1 TABLET ORAL
Qty: 15 | Refills: 1 | Status: ACTIVE | COMMUNITY
Start: 2020-09-03 | End: 1900-01-01

## 2023-08-10 ENCOUNTER — NON-APPOINTMENT (OUTPATIENT)
Age: 63
End: 2023-08-10

## 2023-09-27 ENCOUNTER — EMERGENCY (EMERGENCY)
Facility: HOSPITAL | Age: 63
LOS: 0 days | Discharge: HOME | End: 2023-09-27
Attending: EMERGENCY MEDICINE
Payer: COMMERCIAL

## 2023-09-27 VITALS
RESPIRATION RATE: 18 BRPM | WEIGHT: 186.95 LBS | TEMPERATURE: 98 F | OXYGEN SATURATION: 99 % | SYSTOLIC BLOOD PRESSURE: 156 MMHG | HEART RATE: 73 BPM | DIASTOLIC BLOOD PRESSURE: 97 MMHG
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
## 2023-09-27 DIAGNOSIS — Z98.890 OTHER SPECIFIED POSTPROCEDURAL STATES: Chronic | ICD-10-CM

## 2023-09-27 DIAGNOSIS — S90.01XA CONTUSION OF RIGHT ANKLE, INITIAL ENCOUNTER: ICD-10-CM

## 2023-09-27 DIAGNOSIS — I10 ESSENTIAL (PRIMARY) HYPERTENSION: ICD-10-CM

## 2023-09-27 DIAGNOSIS — M79.671 PAIN IN RIGHT FOOT: ICD-10-CM

## 2023-09-27 DIAGNOSIS — Z92.89 PERSONAL HISTORY OF OTHER MEDICAL TREATMENT: Chronic | ICD-10-CM

## 2023-09-27 DIAGNOSIS — S80.11XA CONTUSION OF RIGHT LOWER LEG, INITIAL ENCOUNTER: ICD-10-CM

## 2023-09-27 DIAGNOSIS — S93.602A UNSPECIFIED SPRAIN OF LEFT FOOT, INITIAL ENCOUNTER: ICD-10-CM

## 2023-09-27 DIAGNOSIS — E78.00 PURE HYPERCHOLESTEROLEMIA, UNSPECIFIED: ICD-10-CM

## 2023-09-27 DIAGNOSIS — M79.672 PAIN IN LEFT FOOT: ICD-10-CM
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
## 2023-09-27 PROCEDURE — 73590 X-RAY EXAM OF LOWER LEG: CPT | Mod: 26,RT

## 2023-09-27 PROCEDURE — 73630 X-RAY EXAM OF FOOT: CPT | Mod: LT

## 2023-09-27 PROCEDURE — 99284 EMERGENCY DEPT VISIT MOD MDM: CPT | Mod: 25

## 2023-09-27 PROCEDURE — 73590 X-RAY EXAM OF LOWER LEG: CPT | Mod: RT

## 2023-09-27 PROCEDURE — 73630 X-RAY EXAM OF FOOT: CPT | Mod: 26,LT

## 2023-09-27 RX ORDER — IBUPROFEN 200 MG
600 TABLET ORAL ONCE
Refills: 0 | Status: COMPLETED | OUTPATIENT
Start: 2023-09-27 | End: 2023-09-27

## 2023-09-27 RX ADMIN — Medication 600 MILLIGRAM(S): at 01:39

## 2023-09-27 NOTE — ED ADULT NURSE NOTE - NSFALLUNIVINTERV_ED_ALL_ED
Bed/Stretcher in lowest position, wheels locked, appropriate side rails in place/Call bell, personal items and telephone in reach/Instruct patient to call for assistance before getting out of bed/chair/stretcher/Non-slip footwear applied when patient is off stretcher/[place] to call system/Physically safe environment - no spills, clutter or unnecessary equipment/Purposeful proactive rounding/Room/bathroom lighting operational, light cord in reach

## 2023-09-27 NOTE — ED PROVIDER NOTE - NS ED ATTENDING STATEMENT MOD
This was a shared visit with the SHANTI. I reviewed and verified the documentation and independently performed the documented:

## 2023-09-27 NOTE — ED PROVIDER NOTE - NSFOLLOWUPINSTRUCTIONS_ED_ALL_ED_FT
Our Emergency Department Referral Coordinators will be reaching out to you in the next 24-48 hours from 9:00am to 5:00pm with a follow up appointment. Please expect a phone call from the hospital in that time frame. If you do not receive a call or if you have any questions or concerns, you can reach them at   [phone]    Contusion  ImageA contusion is a deep bruise. Contusions are the result of a blunt injury to tissues and muscle fibers under the skin. The injury causes bleeding under the skin. The skin overlying the contusion may turn blue, purple, or yellow. Minor injuries will give you a painless contusion, but more severe contusions may stay painful and swollen for a few weeks.    What are the causes?  This condition is usually caused by a blow, trauma, or direct force to an area of the body.    What are the signs or symptoms?  Symptoms of this condition include:    Swelling of the injured area.  Pain and tenderness in the injured area.  Discoloration. The area may have redness and then turn blue, purple, or yellow.    How is this diagnosed?  This condition is diagnosed based on a physical exam and medical history. An X-ray, CT scan, or MRI may be needed to determine if there are any associated injuries, such as broken bones (fractures).    How is this treated?  Specific treatment for this condition depends on what area of the body was injured. In general, the best treatment for a contusion is resting, icing, applying pressure to (compression), and elevating the injured area. This is often called the RICE strategy. Over-the-counter anti-inflammatory medicines may also be recommended for pain control.    Follow these instructions at home:  Rest the injured area.  If directed, apply ice to the injured area:    Put ice in a plastic bag.  Place a towel between your skin and the bag.  Leave the ice on for 20 minutes, 2–3 times per day.    If directed, apply light compression to the injured area using an elastic bandage. Make sure the bandage is not wrapped too tightly. Remove and reapply the bandage as directed by your health care provider.  If possible, raise (elevate) the injured area above the level of your heart while you are sitting or lying down.  Take over-the-counter and prescription medicines only as told by your health care provider.  Contact a health care provider if:  Your symptoms do not improve after several days of treatment.  Your symptoms get worse.  You have difficulty moving the injured area.  Get help right away if:  You have severe pain.  You have numbness in a hand or foot.  Your hand or foot turns pale or cold.  This information is not intended to replace advice given to you by your health care provider. Make sure you discuss any questions you have with your health care provider.    Document Released: 09/27/2006 Document Revised: 04/27/2017 Document Reviewed: 05/04/2016  Elsevier Interactive Patient Education © 2018 [email or web address]      Where does tendonitis develop?    Tendons connect muscle to bone. A tendon is made of material called collagen. Collagen is a key building block of the body. Collagen is considered a connective tissue because it forms tough strands that are like the strands of a nylon rope. Like the strands in a rope, the strands of collagen line up. The more strands, and the better they line up, the stronger they are. The tendon is wrapped in a thin, slippery covering called the tendon sheath. The tendon sheath allows the tendon to slide easily against the tissues around it.    Many parts of the tendon can be injured. Tendon problems can involve the area where the tendon attaches to the bone, the tissue that surrounds the tendon (the tendon sheath), or the main tissues of the tendon. Doctors use different terms to refer to injuries of different parts of the tendon.    Tendon injuries can show up anywhere in your body. Doctors see tendonitis most often in certain sites.      A PATIENT'S GUIDE TO TENDONITIS  INTRODUCTION     Chronic, or long-term, tendon problems are common. Tendon problems are especially common in people who play certain types of sports. Tendon problems account for almost 30 percent of all running injuries and 40 percent of all tennis injuries.    We use the term tendonitis, which means inflammation of the tendon, to refer to these chronic tendon problems. Doctors now know that the tendon does not always become inflamed when it is injured. Other changes in the tendon can cause tendon pain. However, tendonitis is still the most commonly used term.    This guide will help you understand    how tendonitis develops  how doctors diagnose the condition  what can be done for tendonitis  ANATOMY  Where does tendonitis develop?    Tendons connect muscle to bone. A tendon is made of material called collagen. Collagen is a key building block of the body. Collagen is considered a connective tissue because it forms tough strands that are like the strands of a nylon rope. Like the strands in a rope, the strands of collagen line up. The more strands, and the better they line up, the stronger they are. The tendon is wrapped in a thin, slippery covering called the tendon sheath. The tendon sheath allows the tendon to slide easily against the tissues around it.    Many parts of the tendon can be injured. Tendon problems can involve the area where the tendon attaches to the bone, the tissue that surrounds the tendon (the tendon sheath), or the main tissues of the tendon. Doctors use different terms to refer to injuries of different parts of the tendon.    Tendon injuries can show up anywhere in your body. Doctors see tendonitis most often in certain sites.       Achilles Tendonitis  The tendon sheath, the tissues of the tendon, and the attachment to the bone can all become injured in the Achilles tendon, found in the lower leg. Damaged Achilles tendons carry a higher risk of rupturing because of the weight they bear while standing and walking.    Related Document: A Patient's Guide to Achilles Tendon Problems    Posterior Tibial Tendonitis  Tendonitis along the inside edge of the ankle and into the instep of the foot is called posterior tibial tendonitis. It is usually caused by age-related degeneration. If this tendon breaks, it can cause the arch of the foot to become flat and painful.    Related Document: A Patient's Guide to Posterior Tibial Tendon Problems    Patellar and Quadriceps Tendonitis  Problems in the tendons of the knee occur mostly in people whose exercise involves running or jumping. Patellar tendonitis is also called jumper's knee.    De Quervain's Disease and Trigger Finger  Tendon problems are common in the hand and wrist. De Quervain's disease causes pain in the wrist just above the thumb. Trigger finger generally causes pain in the palm just below the knuckles, but it eventually causes problems with movement.    Related Document: A Patient's Guide to De Quervain's Tenosynovitis    Related Document: A Patient's Guide to Trigger Finger and Trigger Thumb       Lateral Epicondylitis  Lateral epicondylitis, also called Tennis elbow, affects the area where the tendons of the elbow attach to bone on the outside of the elbow. It causes pain when using the wrist and hand.    Related Document: A Patient's Guide to Lateral Epicondylitis    Medial Epicondylitis  Medial epicondylitis, also called Golfer's elbow, affects the area where the tendons of the elbow attach to bone on the inside of the elbow. It causes pain when using the wrist and hand.    Related Document: A Patient's Guide to Medial Epicondylitis    Rotator Cuff Tendonitis  Rotator cuff problems of the shoulder range from mild damage to complete tears. They can cause pain even when resting.    Related Document: A Patient's Guide to Rotator Cuff Disease    Often, the muscles or other tissues of the joints become tight, misaligned, or weak around the area of tendon injury. Some of the pain and swelling of tendonitis may actually be in the surrounding tissues.    CAUSES  Why do I have this problem?    Doctors don't know exactly what causes most tendon problems. They think that repetitive stress on the tendon is the most common cause. The tendon can be injured by the repetitive pounding of running and jumping, or by the stress caused by lifting heavy loads over and over again. Tendonitis usually builds up over weeks or months.    If the tendon is too damaged, or if it doesn't get time to heal, the problem becomes chronic (long-lasting). In general, the heavier the load or the more often the stress is repeated, the more likely you are to develop tendonitis.    Too much stress on the tendons can be made worse by other factors. Lack of flexibility or weakness in your muscles can make tendonitis more likely. Shoes that don't fit right, poor equipment, or incorrect technique can also increase your risk of tendon injury. Don't underestimate the benefit of upgrading your equipment. The improved design of athletic shoes over the past few decades seems to have decreased tendon injuries.    Aging seems to cause tendon damage in some cases. As we age, the tissues of the tendon can break down, or degenerate. Age-related tendon problems do not seem to cause inflammation. The tendon material itself is more affected in these conditions and some surgeon's refer to this type of tendon problem as tendonosis.       Some researchers think that a decreased blood supply to the tendons can cause the tendon damage in tendonosis. The decreased blood supply does not allow the tendon to get enough oxygen from the blood. This leads to a condition where the tendon degenerates. The collagen material that makes up the tendon actually becomes weaker and loses its nylon rope appearance. This type of degeneration has been noted in the rotator cuff around the shoulder, in the Achilles tendon in the heel and in the tendons of the elbow.    Many factors can work together to cause tendonitis. For instance, a woman in her forties who takes up running may have tendonitis caused jointly by the degeneration of aging and the mechanical stress of running.    SYMPTOMS  What does tendonitis feel like?    Tendonitis causes pain. This is the primary symptom of tendonitis and tendonosis. The affected tendons are sometimes swollen. In some cases this swelling occurs from actual thickening of the tendon itself. In other cases the swelling comes from thickening or swelling of the tendon sheath. Tendon problems often cause pain after resting, such as when you first get up in the morning. This pain usually goes away within minutes, or even seconds.    The pain or swelling in your tendon may make your joint hard to move. Some types of tendon problems cause crepitus, a crackling feeling when the joint moves. In rare instances the weakened tendon may actually rupture, or break, with a sudden force. This may require surgery to repair.    DIAGNOSIS  How do doctors identify tendonitis?    Your doctor will take a detailed medical history, including many questions about your activities, your job, and your symptoms. Your doctor will also physically examine the sore area. The probing and movement may cause pain, but it is important for your doctor to know exactly where it hurts.    X-rays do not usually show tendon damage. Your doctor may still ask you to get an X-ray or another imaging test to rule out other problems. Sometimes tendon injuries and other joint or muscle problems occur together. In some cases your doctor may recommend a magnetic resonance imaging (MRI) scan to look at the tendons. The MRI scan is a test that uses magnetic waves instead of X-rays. This test shows the tendons and other soft tissues of the body. It can show the damage in the material that makes up the tendon.    In rare cases it is difficult to find the exact source of your pain. In these cases, your doctor may ask you to go through more sophisticated imaging tests. Your doctor may also inject a local anesthetic into the tendon suspected of causing the pain. If the pain goes away, you have found the right tendon.    TREATMENT  What can be done for the problem?    Tendon problems can be difficult to treat effectively. They can last for many months to several years, even with treatment. You should expect your treatment to take from six to nine months. Even if treatment is effective, your pain may come back. The exact treatment your doctor recommends depends on which tendon is affected.    Your doctor will probably recommend nonsteroidal anti-inflammatory drugs (NSAIDs), such as aspirin and ibuprofen, to help control the inflammation and pain. NSAIDs are usually used for a short time with tendon problems. Your doctor may also suggest ice or heat treatments.    If nothing helps relieve the pain, corticosteroid injections around the tendon are sometimes used. Doctors disagree on whether to use corticosteroid injections in tendons. Even if your doctor does use injections, they are not safe in all cases. Injections into tendons can cause more injury--in some cases, the tendons actually tear. It is generally recommended to give no more than three corticosteroid injections, at least three months apart. Patients need to avoid heavy activity for a few weeks after the injection.    You will need to rest to give your body time to heal. If playing tennis injured your tendon, stop playing tennis until it is completely healed. You should only take part in activities that don't strain the injured tendon.    Your doctor may refer you to a physical or occupational therapist. A therapist will teach you stretches and exercises to help your tendon heal and regain its strength. A therapist can also assess your work site and athletic equipment and recommend changes to reduce the strain on your tendon. Depending on your type of injury, you may be asked to try such special equipment as arch supports, heel lifts, and splints.    Most people with chronic tendon problems can find ways to relieve the pain and take part in their normal activities, even if the problem doesn't completely go away. In a few cases, patients can't find ways to manage the pain even after six months. For these patients, surgery may be necessary. Surgery can be very successful in relieving the pain of chronic tendonitis.

## 2023-09-27 NOTE — ED PROVIDER NOTE - PHYSICAL EXAMINATION
CONSTITUTIONAL: Well-appearing; in no apparent distress.   EYES: PERRL; EOM intact.   MS: Mild TTP to lateral aspect of left calcaneus with mild tenderness to proximal right fifth metatarsal.  No focal tenderness to plantar surface of left foot.  Nontender to left ankle, knee and forefoot.  2+ bilateral DP pulse.  Bilateral feet are neurovascularly intact.  SKIN: Right proximal anterior shin hematoma with mild surrounding tenderness.  Healing ecchymosis noted to right ankle most likely from gravity.  NEURO/PSYCH: A & O x 4; grossly unremarkable.

## 2023-09-27 NOTE — ED PROVIDER NOTE - CLINICAL SUMMARY MEDICAL DECISION MAKING FREE TEXT BOX
63-year-old male presents with improved right lower leg pain that was triggered after he hit it into a metal object 11 days ago patient admits to swelling to the lower leg which has improved but still with swelling.  Since then patient has been favoring his left leg and now has new pain to left lateral foot and plantar surface of heel.Nvi   independent interpretation by me no frx no dislocation no fb no  tibifib  right   and to elft foot   Darco shoe given for comfort patient will follow-up with Ortho as an outpatient.  Patient to be discharged from ED in well appearing condition. Any available test results were discussed with and printed  for patient.  Verbal instructions given, including instructions to return to ED immediately for any new, worsening, or concerning symptoms. Limitations of ED work up discussed.  Patient reports understanding of above with capacity and insight. Written discharge instructions additionally given, including follow-up plan.

## 2023-09-27 NOTE — ED PROVIDER NOTE - CARE PROVIDER_API CALL
Frank Jackson  Orthopaedic Surgery  [address] [place] [address]  Phone: [phone]  Fax: [phone]  Follow Up Time:

## 2023-09-27 NOTE — ED PROVIDER NOTE - OBJECTIVE STATEMENT
63 years old male history of hypertension, high cholesterol present complaint of left foot pain for 1 day.  Pain worsened with weightbearing and ambulating.  Patient reports he bumped right anterior lower leg 1 week ago so he has been compensating on his left foot while walking.  Pain rating the left foot mostly to the side of the foot.  Denies numbness and weakness of bilateral lower extremities.  Patient also noticed some ecchymosis to the ankle of right lower leg but denies pain to right ankle and foot.

## 2023-09-27 NOTE — ED PROVIDER NOTE - PATIENT PORTAL LINK FT
You can access the FollowMyHealth Patient Portal offered by [facility] by registering at the following website: [email or web address]. By joining [employer]’s FollowMyHealth portal, you will also be able to view your health information using other applications (apps) compatible with our system.

## 2023-10-05 ENCOUNTER — RX RENEWAL (OUTPATIENT)
Age: 63
End: 2023-10-05

## 2023-10-19 ENCOUNTER — RX RENEWAL (OUTPATIENT)
Age: 63
End: 2023-10-19

## 2023-10-30 ENCOUNTER — APPOINTMENT (OUTPATIENT)
Dept: UROLOGY | Facility: CLINIC | Age: 63
End: 2023-10-30
Payer: COMMERCIAL

## 2023-10-30 VITALS
WEIGHT: 184 LBS | DIASTOLIC BLOOD PRESSURE: 79 MMHG | TEMPERATURE: 98 F | BODY MASS INDEX: 27.89 KG/M2 | SYSTOLIC BLOOD PRESSURE: 127 MMHG | HEART RATE: 93 BPM | HEIGHT: 68 IN

## 2023-10-30 PROCEDURE — 99214 OFFICE O/P EST MOD 30 MIN: CPT

## 2023-11-15 ENCOUNTER — RX RENEWAL (OUTPATIENT)
Age: 63
End: 2023-11-15

## 2024-01-31 ENCOUNTER — APPOINTMENT (OUTPATIENT)
Dept: UROLOGY | Facility: CLINIC | Age: 64
End: 2024-01-31
Payer: COMMERCIAL

## 2024-01-31 DIAGNOSIS — R39.12 POOR URINARY STREAM: ICD-10-CM

## 2024-01-31 DIAGNOSIS — R39.198 OTHER DIFFICULTIES WITH MICTURITION: ICD-10-CM

## 2024-01-31 DIAGNOSIS — R35.0 FREQUENCY OF MICTURITION: ICD-10-CM

## 2024-01-31 DIAGNOSIS — R30.0 DYSURIA: ICD-10-CM

## 2024-01-31 PROCEDURE — G2211 COMPLEX E/M VISIT ADD ON: CPT

## 2024-01-31 PROCEDURE — 99214 OFFICE O/P EST MOD 30 MIN: CPT | Mod: 95

## 2024-02-01 ENCOUNTER — APPOINTMENT (OUTPATIENT)
Dept: UROLOGY | Facility: CLINIC | Age: 64
End: 2024-02-01

## 2024-02-29 ENCOUNTER — RX RENEWAL (OUTPATIENT)
Age: 64
End: 2024-02-29

## 2024-03-04 ENCOUNTER — TRANSCRIPTION ENCOUNTER (OUTPATIENT)
Age: 64
End: 2024-03-04

## 2024-03-11 ENCOUNTER — APPOINTMENT (OUTPATIENT)
Dept: UROLOGY | Facility: CLINIC | Age: 64
End: 2024-03-11
Payer: COMMERCIAL

## 2024-03-11 VITALS
DIASTOLIC BLOOD PRESSURE: 83 MMHG | BODY MASS INDEX: 28.04 KG/M2 | WEIGHT: 185 LBS | HEIGHT: 68 IN | SYSTOLIC BLOOD PRESSURE: 120 MMHG | OXYGEN SATURATION: 98 % | HEART RATE: 113 BPM

## 2024-03-11 DIAGNOSIS — R79.89 OTHER SPECIFIED ABNORMAL FINDINGS OF BLOOD CHEMISTRY: ICD-10-CM

## 2024-03-11 DIAGNOSIS — E28.0 ESTROGEN EXCESS: ICD-10-CM

## 2024-03-11 PROCEDURE — 99214 OFFICE O/P EST MOD 30 MIN: CPT

## 2024-03-11 PROCEDURE — G2211 COMPLEX E/M VISIT ADD ON: CPT

## 2024-03-11 RX ORDER — ASPIRIN 81 MG
81 TABLET, DELAYED RELEASE (ENTERIC COATED) ORAL
Refills: 0 | Status: ACTIVE | COMMUNITY

## 2024-03-11 NOTE — LETTER BODY
[Dear  ___] : Dear  [unfilled], [Courtesy Letter:] : I had the pleasure of seeing your patient, [unfilled], in my office today. [Sincerely,] : Sincerely, [Please see my note below.] : Please see my note below. [FreeTextEntry2] : Joshua Vázquez MD [address] [place], [place] [id number]

## 2024-03-11 NOTE — HISTORY OF PRESENT ILLNESS
[FreeTextEntry1] :  lobo hi Ortega is a 63-year-old male born September 14, 1960 last seen January 31, 2024.  With respect to his urination as long as he takes the tamsulosin he is urinating well repeat to get the room for a day he started having trouble.  We have him on TRT with testosterone cypionate he is now injecting 0.55 mL every 7 days taking the anastrozole 1 mg 3 times a week.  Trough and peak bloods were done March 4 and March 6, 2024 Total testosterone was 670 the peak was 1003 Free testosterone was 132.2 and the patient was 263.4 () Bioavailable was 289.2 and the peak was 507.4 Estradiol was 18 and the peak was 27 Sex hormone binding globulin was 18  Hemoglobin was 14.5 with a platelet count of 355,000 Urine analysis was 1.021 pH 5.5 dip and micro negative Hemoglobin A1c was 5.2 TSH was 1.5 and normal [Nocturia] : nocturia

## 2024-03-11 NOTE — ASSESSMENT
[FreeTextEntry1] : His free testosterone is a drop high on the peak but does not the main drug use the rest are in good shape he has been on this dose for a while and feeling good.  The hemoglobin is 5 and he is happy.  As far as the urination he takes the medication he is good.  He will continue with the 0.55 every 7 days  Along with the anastrozole 1 mg p.o. every other day x 6 days a week and we will get blood in 3-1/2 and see him in 4 months

## 2024-03-11 NOTE — LETTER HEADER
[FreeTextEntry3] : Keyla Garza MD [facility], [address] [place] [address]

## 2024-03-11 NOTE — PHYSICAL EXAM
[General Appearance - Well Developed] : well developed [General Appearance - Well Nourished] : well nourished [Normal Appearance] : normal appearance [Well Groomed] : well groomed [General Appearance - In No Acute Distress] : no acute distress [Heart Rate And Rhythm] : heart rate and rhythm were normal [Edema] : no peripheral edema [Respiration, Rhythm And Depth] : normal respiratory rhythm and effort [Exaggerated Use Of Accessory Muscles For Inspiration] : no accessory muscle use [Auscultation Breath Sounds / Voice Sounds] : lungs were clear to auscultation bilaterally [Abdomen Soft] : soft [Abdomen Tenderness] : non-tender [Costovertebral Angle Tenderness] : no ~M costovertebral angle tenderness [Normal Station and Gait] : the gait and station were normal for the patient's age [] : no rash [No Focal Deficits] : no focal deficits [Oriented To Time, Place, And Person] : oriented to person, place, and time [Affect] : the affect was normal [Mood] : the mood was normal [Not Anxious] : not anxious [de-identified] : rash by the transplant donornsite

## 2024-04-29 NOTE — ED ADULT TRIAGE NOTE - STATUS:
Intact
You Nina  Internal Medicine  [address] [place] [address]  Phone: [phone]  Fax: [phone]  Follow Up Time: 1-3 days

## 2024-05-14 RX ORDER — TAMSULOSIN HYDROCHLORIDE 0.4 MG/1
0.4 CAPSULE ORAL
Qty: 90 | Refills: 1 | Status: ACTIVE | COMMUNITY
Start: 2019-10-31 | End: 1900-01-01

## 2024-05-15 ENCOUNTER — RX RENEWAL (OUTPATIENT)
Age: 64
End: 2024-05-15

## 2024-06-11 RX ORDER — TESTOSTERONE CYPIONATE 200 MG/ML
200 INJECTION, SOLUTION INTRAMUSCULAR
Qty: 1 | Refills: 0 | Status: ACTIVE | COMMUNITY
Start: 1900-01-01 | End: 1900-01-01

## 2024-06-25 NOTE — ED PROVIDER NOTE - PHYSICAL EXAMINATION
Physical Exam    Vital Signs: I have reviewed the initial vital signs.  Constitutional: well-nourished, appears stated age, no acute distress  Abdomen: soft, NT/ND. + wound to RLQ  Skin: + open wound/ulcer noted to RLQ of abdomen. Non fluctuant.   Neuro: AOx3, No focal deficits noted No assistance needed

## 2024-07-08 ENCOUNTER — APPOINTMENT (OUTPATIENT)
Dept: UROLOGY | Facility: CLINIC | Age: 64
End: 2024-07-08
Payer: COMMERCIAL

## 2024-07-08 VITALS
WEIGHT: 178 LBS | SYSTOLIC BLOOD PRESSURE: 150 MMHG | HEART RATE: 107 BPM | HEIGHT: 68 IN | BODY MASS INDEX: 26.98 KG/M2 | OXYGEN SATURATION: 98 % | DIASTOLIC BLOOD PRESSURE: 95 MMHG

## 2024-07-08 DIAGNOSIS — E29.1 TESTICULAR HYPOFUNCTION: ICD-10-CM

## 2024-07-08 DIAGNOSIS — E28.0 ESTROGEN EXCESS: ICD-10-CM

## 2024-07-08 DIAGNOSIS — R79.89 OTHER SPECIFIED ABNORMAL FINDINGS OF BLOOD CHEMISTRY: ICD-10-CM

## 2024-07-08 PROCEDURE — G2211 COMPLEX E/M VISIT ADD ON: CPT

## 2024-07-08 PROCEDURE — 99214 OFFICE O/P EST MOD 30 MIN: CPT

## 2024-07-08 RX ORDER — TIRZEPATIDE 5 MG/.5ML
5 INJECTION, SOLUTION SUBCUTANEOUS
Refills: 0 | Status: ACTIVE | COMMUNITY

## 2024-08-19 ENCOUNTER — APPOINTMENT (OUTPATIENT)
Dept: UROLOGY | Facility: CLINIC | Age: 64
End: 2024-08-19
Payer: COMMERCIAL

## 2024-08-19 DIAGNOSIS — E29.1 TESTICULAR HYPOFUNCTION: ICD-10-CM

## 2024-08-19 PROCEDURE — G2211 COMPLEX E/M VISIT ADD ON: CPT | Mod: 95

## 2024-08-19 PROCEDURE — 99214 OFFICE O/P EST MOD 30 MIN: CPT | Mod: 95

## 2024-08-19 NOTE — LETTER HEADER
[FreeTextEntry3] : Keyla Garza MD [facility], [address] [place] [address]

## 2024-08-19 NOTE — LETTER BODY
[Dear  ___] : Dear  [unfilled], [Courtesy Letter:] : I had the pleasure of seeing your patient, [unfilled], in my office today. [Please see my note below.] : Please see my note below. [Sincerely,] : Sincerely, [FreeTextEntry2] : Joshua Vázquez MD [address] [place], [place] [id number]

## 2024-08-19 NOTE — HISTORY OF PRESENT ILLNESS
[FreeTextEntry1] : [phone] [email or web address] Estradiol in chart from 08/09/2024, Dr. Garza has the bloodwork from 08/07/2024. Spoke with Dr. Garza and he is aware that the lab has messed up and didn't do the bloodwork for 08/05/2024.  Jordan is a 63-year-old male born September 14, 1960 last seen July 08, 2024,   At his last visit his estradiol had dropped precipitously presumably because he is on the Mounjaro so his weight has dropped by 18 pounds which is 10% of his body mass so that explains why the estradiol is dropped so much I do not think he needs the anastrozole anymore. He still on the Mounjaro and hopes to lose more weight probably hoping to get down to about 165  However because of him stopping the Arimidex I will check his bloods and make sure were not stopping it too soon.  Therefore he will get blood in 3 to 4 weeks and see me after the bloods. He knows not to delay and do the bloods at least 1 week before he sees me.   Blood test done on August 7 and August 9, 2024 With him on 0.55 mL, 110 mg of testosterone cypionate every 7 days showed on the  trough/August 7 Total testosterone was 805 Free testosterone was 123 Bioavailable testosterone was 253 Sex hormone binding globulin was 32 Albumin was 4.5  On August 9 the peak they only checked the   estradiol which was 50

## 2024-08-19 NOTE — HISTORY OF PRESENT ILLNESS
[FreeTextEntry1] : [phone] [email or web address] Estradiol in chart from 08/09/2024, Dr. Garza has the bloodwork from 08/07/2024. Spoke with Dr. Garza and he is aware that the lab has messed up and didn't do the bloodwork for 08/05/2024.  Jordan is a 63-year-old male born September 14, 1960 last seen July 08, 2024,   At his last visit his estradiol had dropped precipitously presumably because he is on the Mounjaro so his weight has dropped by 18 pounds which is 10% of his body mass so that explains why the estradiol is dropped so much I do not think he needs the anastrozole anymore. He still on the Mounjaro and hopes to lose more weight probably hoping to get down to about 165  However because of him stopping the Arimidex I will check his bloods and make sure were not stopping it too soon.  Therefore he will get blood in 3 to 4 weeks and see me after the bloods. He knows not to delay and do the bloods at least 1 week before he sees me.   Blood test done on August 7 and August 9, 2024 With him on 0.55 mL, 110 mg of testosterone cypionate every 7 days showed on the  trough/August 7 Total testosterone was 805 Free testosterone was 123 Bioavailable testosterone was 253 Sex hormone binding globulin was 32 Albumin was 4.5  On August 9 the peak they only checked the   estradiol which was 50

## 2024-08-19 NOTE — ASSESSMENT
[FreeTextEntry1] :  He tells me that he is feeling fine, but unfortunately all the labs did not take the correct number of tubes nothing else was done and we did not find out until now  The next day he injects is today; he we will get trough bloods on september 9 He will be away next Monday the following Monday is Labor Day and the labs are close so we are left with September 9 he will then get peak bloods on September 11.  He tells me he is feeling okay I No idea where his numbers are hopefully he will do well.  He also knows that there is a certain number of tubes they have to draw when they getting the peak if they are not doing it he has to asked them why.  And in the interim he should speak to the lab they should waive the fees for both days because I am only getting partial answers and I have to be repeated.  We will see him in office on the 19 of septemeber

## 2024-08-19 NOTE — LETTER BODY
[Dear  ___] : Dear  [unfilled], [Courtesy Letter:] : I had the pleasure of seeing your patient, [unfilled], in my office today. [Please see my note below.] : Please see my note below. [Sincerely,] : Sincerely, [FreeTextEntry2] : Joshua Vázquez MD [address] [place], [place] [id number]

## 2024-08-19 NOTE — LETTER HEADER
[FreeTextEntry3] : Keyla Garza MD [facility], [address] [place] [address]

## 2024-09-05 NOTE — ED ADULT TRIAGE NOTE - BP NONINVASIVE SYSTOLIC (MM HG)
150 What Type Of Note Output Would You Prefer (Optional)?: Standard Output Hpi Title: Evaluation of Skin Lesions How Severe Are Your Spot(S)?: moderate Have Your Spot(S) Been Treated In The Past?: has not been treated Location: Thigh 1997, 2019 ankle Year Removed: 1997 How Severe Are Your Spot(S)?: mild Location: Thigh

## 2024-10-11 ENCOUNTER — APPOINTMENT (OUTPATIENT)
Dept: UROLOGY | Facility: CLINIC | Age: 64
End: 2024-10-11
Payer: COMMERCIAL

## 2024-10-11 VITALS
SYSTOLIC BLOOD PRESSURE: 143 MMHG | OXYGEN SATURATION: 97 % | HEART RATE: 81 BPM | WEIGHT: 179 LBS | BODY MASS INDEX: 27.13 KG/M2 | RESPIRATION RATE: 18 BRPM | HEIGHT: 68 IN | DIASTOLIC BLOOD PRESSURE: 88 MMHG

## 2024-10-11 PROCEDURE — 99214 OFFICE O/P EST MOD 30 MIN: CPT

## 2024-10-11 PROCEDURE — G2211 COMPLEX E/M VISIT ADD ON: CPT | Mod: NC

## 2024-10-17 ENCOUNTER — APPOINTMENT (OUTPATIENT)
Dept: UROLOGY | Facility: CLINIC | Age: 64
End: 2024-10-17
Payer: COMMERCIAL

## 2024-10-17 DIAGNOSIS — E29.1 TESTICULAR HYPOFUNCTION: ICD-10-CM

## 2024-10-17 PROCEDURE — 99214 OFFICE O/P EST MOD 30 MIN: CPT

## 2024-10-17 RX ORDER — NEEDLES, DISPOSABLE 25GX5/8"
23G X 1" NEEDLE, DISPOSABLE MISCELLANEOUS
Qty: 30 | Refills: 0 | Status: ACTIVE | COMMUNITY
Start: 2024-10-11 | End: 1900-01-01

## 2024-11-22 ENCOUNTER — APPOINTMENT (OUTPATIENT)
Dept: UROLOGY | Facility: CLINIC | Age: 64
End: 2024-11-22

## 2024-12-27 ENCOUNTER — APPOINTMENT (OUTPATIENT)
Dept: UROLOGY | Facility: CLINIC | Age: 64
End: 2024-12-27

## 2025-02-06 ENCOUNTER — APPOINTMENT (OUTPATIENT)
Dept: UROLOGY | Facility: CLINIC | Age: 65
End: 2025-02-06
Payer: COMMERCIAL

## 2025-02-06 VITALS
RESPIRATION RATE: 18 BRPM | WEIGHT: 163.13 LBS | DIASTOLIC BLOOD PRESSURE: 86 MMHG | BODY MASS INDEX: 24.72 KG/M2 | OXYGEN SATURATION: 98 % | HEIGHT: 68 IN | SYSTOLIC BLOOD PRESSURE: 133 MMHG | HEART RATE: 87 BPM | TEMPERATURE: 98 F

## 2025-02-06 DIAGNOSIS — E29.1 TESTICULAR HYPOFUNCTION: ICD-10-CM

## 2025-02-06 PROCEDURE — 99214 OFFICE O/P EST MOD 30 MIN: CPT

## 2025-03-10 ENCOUNTER — APPOINTMENT (OUTPATIENT)
Dept: UROLOGY | Facility: CLINIC | Age: 65
End: 2025-03-10

## 2025-04-04 ENCOUNTER — APPOINTMENT (OUTPATIENT)
Dept: UROLOGY | Facility: CLINIC | Age: 65
End: 2025-04-04
Payer: COMMERCIAL

## 2025-04-04 VITALS
BODY MASS INDEX: 25.48 KG/M2 | HEIGHT: 69 IN | SYSTOLIC BLOOD PRESSURE: 129 MMHG | WEIGHT: 172 LBS | TEMPERATURE: 97.5 F | OXYGEN SATURATION: 97 % | DIASTOLIC BLOOD PRESSURE: 84 MMHG | HEART RATE: 84 BPM | RESPIRATION RATE: 16 BRPM

## 2025-04-04 DIAGNOSIS — E29.1 TESTICULAR HYPOFUNCTION: ICD-10-CM

## 2025-04-04 PROCEDURE — 99214 OFFICE O/P EST MOD 30 MIN: CPT

## 2025-05-05 ENCOUNTER — RX RENEWAL (OUTPATIENT)
Age: 65
End: 2025-05-05

## 2025-05-12 ENCOUNTER — APPOINTMENT (OUTPATIENT)
Dept: ORTHOPEDIC SURGERY | Facility: CLINIC | Age: 65
End: 2025-05-12
Payer: COMMERCIAL

## 2025-05-12 DIAGNOSIS — M51.369: ICD-10-CM

## 2025-05-12 DIAGNOSIS — S39.012A STRAIN OF MUSCLE, FASCIA AND TENDON OF LOWER BACK, INITIAL ENCOUNTER: ICD-10-CM

## 2025-05-12 DIAGNOSIS — M43.16: ICD-10-CM

## 2025-05-12 PROCEDURE — 72110 X-RAY EXAM L-2 SPINE 4/>VWS: CPT

## 2025-05-12 PROCEDURE — 99203 OFFICE O/P NEW LOW 30 MIN: CPT

## 2025-05-12 RX ORDER — METHOCARBAMOL 750 MG/1
750 TABLET, FILM COATED ORAL
Qty: 60 | Refills: 0 | Status: ACTIVE | COMMUNITY
Start: 2025-05-12 | End: 1900-01-01

## 2025-05-12 RX ORDER — NABUMETONE 750 MG/1
750 TABLET, FILM COATED ORAL
Qty: 60 | Refills: 0 | Status: ACTIVE | COMMUNITY
Start: 2025-05-12 | End: 1900-01-01

## 2025-05-15 ENCOUNTER — APPOINTMENT (OUTPATIENT)
Dept: PAIN MANAGEMENT | Facility: CLINIC | Age: 65
End: 2025-05-15
Payer: COMMERCIAL

## 2025-05-15 VITALS — HEIGHT: 69 IN | BODY MASS INDEX: 25.48 KG/M2 | WEIGHT: 172 LBS

## 2025-05-15 DIAGNOSIS — M54.50 LOW BACK PAIN, UNSPECIFIED: ICD-10-CM

## 2025-05-15 DIAGNOSIS — M54.16 RADICULOPATHY, LUMBAR REGION: ICD-10-CM

## 2025-05-15 PROCEDURE — 99213 OFFICE O/P EST LOW 20 MIN: CPT

## 2025-06-23 ENCOUNTER — APPOINTMENT (OUTPATIENT)
Dept: ORTHOPEDIC SURGERY | Facility: CLINIC | Age: 65
End: 2025-06-23

## 2025-06-25 ENCOUNTER — APPOINTMENT (OUTPATIENT)
Dept: PAIN MANAGEMENT | Facility: CLINIC | Age: 65
End: 2025-06-25
Payer: COMMERCIAL

## 2025-06-25 PROCEDURE — 99214 OFFICE O/P EST MOD 30 MIN: CPT

## 2025-07-07 ENCOUNTER — APPOINTMENT (OUTPATIENT)
Dept: UROLOGY | Facility: CLINIC | Age: 65
End: 2025-07-07
Payer: COMMERCIAL

## 2025-07-07 PROCEDURE — G2211 COMPLEX E/M VISIT ADD ON: CPT | Mod: NC

## 2025-07-07 PROCEDURE — 99215 OFFICE O/P EST HI 40 MIN: CPT

## 2025-07-15 RX ORDER — DIAZEPAM 5 MG/1
5 TABLET ORAL
Qty: 2 | Refills: 0 | Status: ACTIVE | COMMUNITY
Start: 2025-07-15 | End: 1900-01-01

## 2025-07-25 ENCOUNTER — APPOINTMENT (OUTPATIENT)
Dept: PAIN MANAGEMENT | Facility: CLINIC | Age: 65
End: 2025-07-25
Payer: COMMERCIAL

## 2025-07-25 PROCEDURE — 94761 N-INVAS EAR/PLS OXIMETRY MLT: CPT

## 2025-07-25 PROCEDURE — 93040 RHYTHM ECG WITH REPORT: CPT | Mod: 79

## 2025-07-25 PROCEDURE — 93770 DETERMINATION VENOUS PRESS: CPT

## 2025-07-25 PROCEDURE — 64484 NJX AA&/STRD TFRM EPI L/S EA: CPT | Mod: 50

## 2025-07-25 PROCEDURE — 64483 NJX AA&/STRD TFRM EPI L/S 1: CPT | Mod: 50

## 2025-08-06 ENCOUNTER — APPOINTMENT (OUTPATIENT)
Dept: PAIN MANAGEMENT | Facility: CLINIC | Age: 65
End: 2025-08-06
Payer: COMMERCIAL

## 2025-08-06 DIAGNOSIS — M54.16 RADICULOPATHY, LUMBAR REGION: ICD-10-CM

## 2025-08-06 DIAGNOSIS — M54.50 LOW BACK PAIN, UNSPECIFIED: ICD-10-CM

## 2025-08-06 PROCEDURE — 99213 OFFICE O/P EST LOW 20 MIN: CPT
